# Patient Record
Sex: FEMALE | Race: WHITE | Employment: OTHER | URBAN - METROPOLITAN AREA
[De-identification: names, ages, dates, MRNs, and addresses within clinical notes are randomized per-mention and may not be internally consistent; named-entity substitution may affect disease eponyms.]

---

## 2021-02-01 ENCOUNTER — OFFICE VISIT (OUTPATIENT)
Dept: OBGYN CLINIC | Age: 66
End: 2021-02-01
Payer: COMMERCIAL

## 2021-02-01 VITALS
HEIGHT: 63 IN | SYSTOLIC BLOOD PRESSURE: 132 MMHG | BODY MASS INDEX: 37.21 KG/M2 | WEIGHT: 210 LBS | DIASTOLIC BLOOD PRESSURE: 78 MMHG

## 2021-02-01 DIAGNOSIS — N89.8 VAGINAL ODOR: ICD-10-CM

## 2021-02-01 DIAGNOSIS — N89.8 VAGINAL ITCHING: Primary | ICD-10-CM

## 2021-02-01 DIAGNOSIS — L29.2 VULVAR ITCHING: ICD-10-CM

## 2021-02-01 PROCEDURE — 99203 OFFICE O/P NEW LOW 30 MIN: CPT | Performed by: OBSTETRICS & GYNECOLOGY

## 2021-02-01 RX ORDER — LANOLIN ALCOHOL/MO/W.PET/CERES
1000 CREAM (GRAM) TOPICAL DAILY
COMMUNITY

## 2021-02-01 RX ORDER — ISOSORBIDE MONONITRATE 30 MG/1
TABLET, EXTENDED RELEASE ORAL
COMMUNITY
Start: 2020-12-20 | End: 2021-08-24 | Stop reason: SDUPTHER

## 2021-02-01 RX ORDER — NITROGLYCERIN 0.4 MG/1
0.4 TABLET SUBLINGUAL EVERY 5 MIN PRN
COMMUNITY

## 2021-02-01 RX ORDER — CLOBETASOL PROPIONATE 0.5 MG/G
OINTMENT TOPICAL
Qty: 1 TUBE | Refills: 2 | Status: SHIPPED | OUTPATIENT
Start: 2021-02-01

## 2021-02-01 RX ORDER — ASPIRIN 81 MG/1
81 TABLET ORAL DAILY
COMMUNITY

## 2021-02-01 RX ORDER — ACETAMINOPHEN 160 MG
TABLET,DISINTEGRATING ORAL
COMMUNITY

## 2021-02-01 RX ORDER — PHENOL 1.4 %
1 AEROSOL, SPRAY (ML) MUCOUS MEMBRANE DAILY
COMMUNITY

## 2021-02-01 RX ORDER — ATORVASTATIN CALCIUM 20 MG/1
TABLET, FILM COATED ORAL
COMMUNITY
Start: 2020-12-17 | End: 2021-08-24 | Stop reason: SDUPTHER

## 2021-02-01 RX ORDER — PANTOPRAZOLE SODIUM 40 MG/1
40 GRANULE, DELAYED RELEASE ORAL
COMMUNITY
End: 2021-02-02

## 2021-02-01 RX ORDER — ANTIARTHRITIC COMBINATION NO.2 900 MG
TABLET ORAL
COMMUNITY

## 2021-02-01 RX ORDER — ALBUTEROL SULFATE 90 UG/1
AEROSOL, METERED RESPIRATORY (INHALATION)
COMMUNITY
Start: 2021-01-18

## 2021-02-01 RX ORDER — MULTIVIT-MIN/IRON/FOLIC ACID/K 18-600-40
CAPSULE ORAL
COMMUNITY

## 2021-02-01 RX ORDER — LOSARTAN POTASSIUM 50 MG/1
TABLET ORAL
COMMUNITY
Start: 2020-12-17

## 2021-02-01 RX ORDER — SPIRONOLACTONE 25 MG/1
TABLET ORAL
COMMUNITY
Start: 2020-12-17 | End: 2021-08-24 | Stop reason: SDUPTHER

## 2021-02-01 RX ORDER — METOPROLOL SUCCINATE 25 MG/1
TABLET, EXTENDED RELEASE ORAL
COMMUNITY
Start: 2020-12-17 | End: 2021-08-24 | Stop reason: SDUPTHER

## 2021-02-01 SDOH — HEALTH STABILITY: MENTAL HEALTH: HOW MANY STANDARD DRINKS CONTAINING ALCOHOL DO YOU HAVE ON A TYPICAL DAY?: NOT ASKED

## 2021-02-01 ASSESSMENT — PATIENT HEALTH QUESTIONNAIRE - PHQ9
SUM OF ALL RESPONSES TO PHQ9 QUESTIONS 1 & 2: 0
2. FEELING DOWN, DEPRESSED OR HOPELESS: 0
SUM OF ALL RESPONSES TO PHQ QUESTIONS 1-9: 0
SUM OF ALL RESPONSES TO PHQ QUESTIONS 1-9: 0
1. LITTLE INTEREST OR PLEASURE IN DOING THINGS: 0

## 2021-02-01 NOTE — PROGRESS NOTES
SUBJECTIVE:   63 y.o. [id number] female complains of significant vulvar itching for several years. Pt has not been seen or treated for this issue. PT was previously advised her vaginal skin was 'very thin'. Pt denies any discharge or odor. Pt has tried anti-itch baths and pt has applied vinegar to the area without relief. Review of Systems:  General ROS: negative  Psychological ROS: negative  ENT ROS: negative  Endocrine ROS: negative  Respiratory ROS: no cough, shortness of breath, or wheezing  Cardiovascular ROS: no chest pain or dyspnea on exertion  Gastrointestinal ROS: no abdominal pain, change in bowel habits, or black or bloody stools  Genito-Urinary ROS: no dysuria, trouble voiding, or hematuria  Musculoskeletal ROS: negative  Neurological ROS: no TIA or stroke symptoms  Dermatological ROS: negative    OBJECTIVE:   /78   Ht 5' 3\" (1.6 m)   Wt 210 lb (95.3 kg)   BMI 37.20 kg/m²     Physical Exam:  CONSTITUTIONAL: She appears well nourished and developed   NEUROLOGIC: Alert and oriented to time, place and person  NECK: no thyroidmegaly  LUNGS: Clear to ascultation bilaterally  CVS: regular rate and rhythm  LYMPHATIC: No palpable lymph nodes  ABDOMEN: benign, soft, nontender, no masses. No liver or splenic organomegaly. No evidence of abdominal or inguinal hernia. No indication for occult blood testing  SKIN: normal texture and tone, no lesions  NEURO: normal tone, no hyperreflexia, 1+DTRs throughout    Pelvic Exam:   EFG: lichen sclerosis to anus  URETHRAL MEATUS: normal size, no diverticula   URETHRA: normal appearing without diverticula or lesions  BLADDER:  No masses or tenderness  VAGINA: normal rugae, no discharge, atrophic changes  PERINEUM: normal appearing without lesions or masses    ASSESSMENT:   Vaginitis  Lichen sclerosis    PLAN:   Past medical, social and family history reviewed and updated in pt's chart. STI panel sent to lab.   Rx clobetasol to pharmacy   Pt to use medication BID x 2wks and if sx not resolved plan for biopsy

## 2021-02-02 ENCOUNTER — OFFICE VISIT (OUTPATIENT)
Dept: CARDIOLOGY CLINIC | Age: 66
End: 2021-02-02
Payer: COMMERCIAL

## 2021-02-02 VITALS
HEART RATE: 68 BPM | DIASTOLIC BLOOD PRESSURE: 80 MMHG | WEIGHT: 212 LBS | BODY MASS INDEX: 37.55 KG/M2 | SYSTOLIC BLOOD PRESSURE: 130 MMHG

## 2021-02-02 DIAGNOSIS — I25.10 CORONARY ARTERY DISEASE INVOLVING NATIVE CORONARY ARTERY OF NATIVE HEART WITHOUT ANGINA PECTORIS: ICD-10-CM

## 2021-02-02 DIAGNOSIS — R73.03 BORDERLINE DIABETES: ICD-10-CM

## 2021-02-02 DIAGNOSIS — I10 ESSENTIAL HYPERTENSION: ICD-10-CM

## 2021-02-02 DIAGNOSIS — E66.9 CLASS II OBESITY: ICD-10-CM

## 2021-02-02 DIAGNOSIS — Z87.891 HISTORY OF TOBACCO ABUSE: ICD-10-CM

## 2021-02-02 DIAGNOSIS — E78.2 MIXED HYPERLIPIDEMIA: ICD-10-CM

## 2021-02-02 DIAGNOSIS — R07.2 PRECORDIAL PAIN: Primary | ICD-10-CM

## 2021-02-02 PROBLEM — R07.9 CHEST PAIN: Status: ACTIVE | Noted: 2021-02-02

## 2021-02-02 PROBLEM — K92.2 GASTROINTESTINAL HEMORRHAGE: Status: ACTIVE | Noted: 2021-02-02

## 2021-02-02 PROBLEM — R94.39 CARDIOVASCULAR STRESS TEST ABNORMAL: Status: ACTIVE | Noted: 2021-02-02

## 2021-02-02 PROBLEM — T50.905A ADVERSE REACTION TO DRUG: Status: ACTIVE | Noted: 2021-02-02

## 2021-02-02 PROBLEM — J44.9 CHRONIC OBSTRUCTIVE LUNG DISEASE (HCC): Status: ACTIVE | Noted: 2021-02-02

## 2021-02-02 PROBLEM — E78.5 HYPERLIPIDEMIA: Status: ACTIVE | Noted: 2021-02-02

## 2021-02-02 PROCEDURE — 99204 OFFICE O/P NEW MOD 45 MIN: CPT | Performed by: INTERNAL MEDICINE

## 2021-02-02 PROCEDURE — 93000 ELECTROCARDIOGRAM COMPLETE: CPT | Performed by: INTERNAL MEDICINE

## 2021-02-02 RX ORDER — PANTOPRAZOLE SODIUM 40 MG/1
40 TABLET, DELAYED RELEASE ORAL DAILY
COMMUNITY
End: 2021-08-24

## 2021-02-02 NOTE — PROGRESS NOTES
35 weeks pregnant due date is  on 2020.  Second pregnancy and on first pregnancy developed fluid around heart and was having shortness of breath and heart palpitations.  Today Rocío is really out of breath and more dizzy and upper back pain.  Heart rate is currently 130 on its lowest.  Rocío is having shortness of breath most of the time.  Shortness of breath started on Thursday and not seen for shortness of breath.  Rocío had low iron and was given iron after  and transfused after delivery.   FNA paged on call MD Anushka Berumen to phone FNA back at 10:37 am.  MD advised to have patient Rocío go to ED and FNA relayed message to Rocío.  Rocío agreed and will go to ED.      Reason for Disposition    [1] MILD difficulty breathing (e.g., minimal/no SOB at rest, SOB with walking, pulse <100) AND [2] NEW-onset or WORSE than normal    Additional Information    Negative: [1] Breathing stopped AND [2] hasn't returned    Negative: Choking on something    Negative: Severe difficulty breathing (e.g., struggling for each breath, speaks in single words)    Negative: Bluish (or gray) lips or face now    Negative: Difficult to awaken or acting confused (e.g., disoriented, slurred speech)    Negative: Passed out (i.e., lost consciousness, collapsed and was not responding)    Negative: Wheezing started suddenly after medicine, an allergic food or bee sting    Negative: Stridor    Negative: Slow, shallow and weak breathing    Negative: Sounds like a life-threatening emergency to the triager    Negative: Chest pain    Negative: [1] Wheezing (high pitched whistling sound) AND [2] previous asthma attacks or use of asthma medicines    Negative: [1] Difficulty breathing AND [2] only present when coughing    Negative: [1] Difficulty breathing AND [2] only from stuffy or runny nose    Negative: [1] MODERATE difficulty breathing (e.g., speaks in phrases, SOB even at rest, pulse 100-120) AND [2] NEW-onset or WORSE than  Chief Complaint   Patient presents with   Kalpana Woodard Cardiologist     [facility]    Coronary Artery Disease    Chest Pain       2-2-21: Patient presents for initial medical evaluation. Patient is followed on a regular basis by Dr. Isha Garcia DO. Patient recently moved from [place] [facility]. She was follow-up with cardiology and noted to have angina, status post abnormal stress test a couple years ago and underwent cardiac catheterization which showed 40% coronary artery disease stenosis. No PCI was performed. Patient also noted to have hypertension and placed on blood pressure medications. Pt denies chest pain, dyspnea, dyspnea on exertion, change in exercise capacity, fatigue,  nausea, vomiting, diarrhea, constipation, motor weakness, insomnia, weight loss, syncope, dizziness, lightheadedness, palpitations, PND, orthopnea, or claudication. EKG today with normal sinus rhythm, right bundle branch block, no evidence of any acute ischemia. She denies tobacco abuse. She states she has been diagnosed with borderline diabetes. Positive history of COPD, quit smoking a few years ago.           Patient Active Problem List   Diagnosis    Hyperlipidemia    Gastrointestinal hemorrhage    Chronic obstructive lung disease ([address]    Chest pain    Cardiovascular stress test abnormal    Adverse reaction to drug    Hx of colonic polyps    Arthritis    Coronary artery disease involving native coronary artery of native heart without angina pectoris    Essential hypertension    Borderline diabetes    Class II obesity    History of tobacco abuse       Past Surgical History:   Procedure Laterality Date    ABDOMEN SURGERY      rupture spleen    APPENDECTOMY      ENDOMETRIAL ABLATION      TUBAL LIGATION         Social History     Socioeconomic History    Marital status: Single     Spouse name: Not on file    Number of children: Not on file    Years of education: Not on file    "normal    Negative: Wheezing can be heard across the room    Negative: Drooling or spitting out saliva (because can't swallow)    Negative: History of prior \"blood clot\" in leg or lungs (i.e., deep vein thrombosis, pulmonary embolism)    Negative: History of inherited increased risk of blood clots (e.g., Factor 5 Leiden, Anti-thrombin 3, Protein C or Protein S deficiency, Prothrombin mutation)    Negative: Recent illness requiring prolonged bedrest (i.e., immobilization)    Negative: Hip or leg fracture in past 2 months (e.g., had cast on leg or ankle)    Negative: Major surgery in the past month    Negative: Recent long-distance travel with prolonged time in car, bus, plane, or train (i.e., within past 2 weeks; 6 or  more hours duration)    Negative: Extra heart beats OR irregular heart beating   (i.e., \"palpitations\")    Negative: Fever > 103 F (39.4 C)    Negative: [1] Fever > 101 F (38.3 C) AND [2] age > 60    Negative: [1] Fever > 100.0 F (37.8 C) AND [2] bedridden (e.g., nursing home patient, CVA, chronic illness, recovering from surgery)    Negative: [1] Fever > 100.0 F (37.8 C) AND [2] diabetes mellitus or weak immune system (e.g., HIV positive, cancer chemo, splenectomy, chronic steroids)    Negative: [1] Periods where breathing stops and then resumes normally AND [2] bedridden (e.g., nursing home patient, CVA)    Negative: Pregnant or postpartum (< 1 month since delivery)    Negative: Patient sounds very sick or weak to the triager    Protocols used: BREATHING DIFFICULTY-A-AH      " Highest education level: Not on file   Occupational History    Not on file   Social Needs    Financial resource strain: Not on file    Food insecurity     Worry: Not on file     Inability: Not on file    Transportation needs     Medical: Not on file     Non-medical: Not on file   Tobacco Use    Smoking status: Never Smoker    Smokeless tobacco: Never Used   Substance and Sexual Activity    Alcohol use: Yes     Comment: rarely    Drug use: Never    Sexual activity: Not on file   Lifestyle    Physical activity     Days per week: Not on file     Minutes per session: Not on file    Stress: Not on file   Relationships    Social connections     Talks on phone: Not on file     Gets together: Not on file     Attends [employer] service: Not on file     Active member of club or organization: Not on file     Attends meetings of clubs or organizations: Not on file     Relationship status: Not on file    Intimate partner violence     Fear of current or ex partner: Not on file     Emotionally abused: Not on file     Physically abused: Not on file     Forced sexual activity: Not on file   Other Topics Concern    Not on file   Social History Narrative    Not on file       Family History   Problem Relation Age of Onset    Cancer Maternal Grandmother     Breast Cancer Neg Hx     Colon Cancer Neg Hx     Eclampsia Neg Hx     Diabetes Neg Hx     Hypertension Neg Hx     Ovarian Cancer Neg Hx      Labor Neg Hx     Spont Abortions Neg Hx     Stroke Neg Hx        Current Outpatient Medications   Medication Sig Dispense Refill    pantoprazole (PROTONIX) 40 MG tablet Take 40 mg by mouth daily      albuterol sulfate  (90 Base) MCG/ACT inhaler       atorvastatin (LIPITOR) 20 MG tablet TAKE 1 TABLET BY MOUTH ONCE DAILY FOR 30 DAYS      isosorbide mononitrate (IMDUR) 30 MG extended release tablet TAKE 1 2 (ONE HALF) TABLET BY MOUTH ONCE DAILY FOR 30 DAYS      losartan (COZAAR) 50 MG tablet TAKE 1 TABLET BY MOUTH ONCE DAILY      metoprolol succinate (TOPROL XL) 25 MG extended release tablet TAKE 1 TABLET BY MOUTH ONCE DAILY FOR 30 DAYS      aspirin 81 MG EC tablet Take 81 mg by mouth daily      nitroGLYCERIN (NITROSTAT) 0.4 MG SL tablet Place 0.4 mg under the tongue every 5 minutes as needed for Chest pain up to max of 3 total doses. If no relief after 1 dose, call 911.  Biotin 5000 MCG TABS Take by mouth      calcium carbonate 600 MG TABS tablet Take 1 tablet by mouth daily      spironolactone (ALDACTONE) 25 MG tablet TAKE 1 TABLET BY MOUTH ONCE DAILY WITH FOOD FOR 30 DAYS      Chromium-Cinnamon (CINNAMON PLUS CHROMIUM)  MCG-MG CAPS Take by mouth      vitamin B-12 (CYANOCOBALAMIN) 1000 MCG tablet Take 1,000 mcg by mouth daily      Ascorbic Acid (VITAMIN C) 500 MG CAPS Take by mouth      Cholecalciferol (VITAMIN D3) 50 MCG (2000 UT) CAPS Take by mouth      clobetasol (TEMOVATE) 0.05 % ointment Apply to affected area BID x 2 wks 1 Tube 2     No current facility-administered medications for this visit. Novocain [procaine]    Review of Systems:  General ROS: negative  Psychological ROS: negative  Hematological and Lymphatic ROS: No history of blood clots or bleeding disorder. Respiratory ROS: no cough, shortness of breath, or wheezing  Cardiovascular ROS: no chest pain or dyspnea on exertion  Gastrointestinal ROS: no abdominal pain, change in bowel habits, or black or bloody stools  Genito-Urinary ROS: no dysuria, trouble voiding, or hematuria  Musculoskeletal ROS: negative  Neurological ROS: no TIA or stroke symptoms  Dermatological ROS: negative    VITALS:  Blood pressure 130/80, pulse 68, weight 212 lb (96.2 kg). Body mass index is 37.55 kg/m². Physical Examination:  General appearance - alert, well appearing, and in no distress  Mental status - alert, oriented to person, place, and time  Neck - Neck is supple, no JVD or carotid bruits. No thyromegaly or adenopathy.    Chest - clear to auscultation, no wheezes, rales or rhonchi, symmetric air entry  Heart - normal rate, regular rhythm, normal S1, S2, no murmurs, rubs, clicks or gallops  Abdomen - soft, nontender, nondistended, no masses or organomegaly  Neurological - alert, oriented, normal speech, no focal findings or movement disorder noted  Extremities - peripheral pulses normal, no pedal edema, no clubbing or cyanosis  Skin - normal coloration and turgor, no rashes, no suspicious skin lesions noted      EKG: normal sinus rhythm, nonspecific ST and T waves changes, RBBB    Orders Placed This Encounter   Procedures    EKG 12 Lead       ASSESSMENT:     Diagnosis Orders   1. Precordial pain  EKG 12 Lead   2. Coronary artery disease involving native coronary artery of native heart without angina pectoris     3. Mixed hyperlipidemia     4. Essential hypertension     5. Borderline diabetes     6. Class II obesity     7. History of tobacco abuse           PLAN:     Patient will need to continue to follow up with you for their general medical care    As always, aggressive risk factor modification is strongly recommended. We should adhere to the JNC VIII guidelines for HTN management and the NCEP ATP III guidelines for LDL-C management. Cardiac diet is always recommended with low fat, cholesterol, calories and sodium. Continue medications at current doses. Will continue to monitor patient clinically, if symptoms develop or worsen, they are to let me know ASAP or head to the nearest emergeny room. Patient was advised and encouraged to check blood pressure at home or at a pharmacy, maintain a logbook, and also call us back if blood pressure are above the target ranges or if it is low. Patient clearly understands and agrees to the instructions. We will need to continue to monitor muscle and liver enzymes, BUN, CR, and electrolytes.     Details of medical condition explained and patient was warned about adverse consequences of uncontrolled medical conditions and possible side effects of prescribed medications.

## 2021-02-08 DIAGNOSIS — N89.8 VAGINAL ITCHING: ICD-10-CM

## 2021-02-08 DIAGNOSIS — N89.8 VAGINAL ODOR: ICD-10-CM

## 2021-02-09 ENCOUNTER — HOSPITAL ENCOUNTER (OUTPATIENT)
Dept: WOMENS IMAGING | Age: 66
Discharge: HOME | End: 2021-02-11
Payer: COMMERCIAL

## 2021-02-09 DIAGNOSIS — Z12.31 BREAST CANCER SCREENING BY MAMMOGRAM: ICD-10-CM

## 2021-02-09 PROCEDURE — 77067 SCR MAMMO BI INCL CAD: CPT

## 2021-02-17 ENCOUNTER — OFFICE VISIT (OUTPATIENT)
Dept: PULMONOLOGY | Age: 66
End: 2021-02-17
Payer: COMMERCIAL

## 2021-02-17 VITALS
TEMPERATURE: 97.3 F | BODY MASS INDEX: 37.39 KG/M2 | HEIGHT: 63 IN | OXYGEN SATURATION: 98 % | WEIGHT: 211 LBS | DIASTOLIC BLOOD PRESSURE: 72 MMHG | HEART RATE: 74 BPM | SYSTOLIC BLOOD PRESSURE: 138 MMHG

## 2021-02-17 DIAGNOSIS — J44.9 CHRONIC OBSTRUCTIVE PULMONARY DISEASE, UNSPECIFIED COPD TYPE (HCC): Primary | ICD-10-CM

## 2021-02-17 DIAGNOSIS — E66.9 OBESITY (BMI 30-39.9): ICD-10-CM

## 2021-02-17 PROCEDURE — 99204 OFFICE O/P NEW MOD 45 MIN: CPT | Performed by: INTERNAL MEDICINE

## 2021-02-17 NOTE — PROGRESS NOTES
covid  Subjective:     Jolie Lovett is a 63 y.o. female who complains today of:     Chief Complaint   Patient presents with    New Patient     here for new patient appointment    COPD       HPI  She is diagnose with COPD, emphysema 14 yrs ago . She was placed nebulizer for breathing . She said she quit smoking at that time . She said she smoke for 35 yrs. She is on albuterol HFA  Prn . She has Symbicort but not using it. C/o shortness of breath with exertion not all the time . No Wheezing   Occasional Cough with  Clear Sputum  No Hemoptysis  No Chest tightness   No Chest pain with radiation  or pleuritic pain  No Fever or chills. No Rhinorrhea and postnasal drip.       Allergies:  Novocain [procaine]  Past Medical History:   Diagnosis Date    Borderline diabetes 2021    Class II obesity 2021    Coronary artery disease involving native coronary artery of native heart without angina pectoris 2021    Coronary artery disease involving native coronary artery of native heart without angina pectoris 2021    Essential hypertension 2021    Heart disease     History of tobacco abuse 2021    Hypertension      Past Surgical History:   Procedure Laterality Date    ABDOMEN SURGERY      rupture spleen    APPENDECTOMY      ENDOMETRIAL ABLATION      TUBAL LIGATION       Family History   Problem Relation Age of Onset    Cancer Maternal Grandmother     Breast Cancer Neg Hx     Colon Cancer Neg Hx     Eclampsia Neg Hx     Diabetes Neg Hx     Hypertension Neg Hx     Ovarian Cancer Neg Hx      Labor Neg Hx     Spont Abortions Neg Hx     Stroke Neg Hx      Social History     Socioeconomic History    Marital status: Single     Spouse name: Not on file    Number of children: Not on file    Years of education: Not on file    Highest education level: Not on file   Occupational History    Not on file   Social Needs    Financial resource strain: Not on file   Franco-Dali insecurity     Worry: Not on file     Inability: Not on file    Transportation needs     Medical: Not on file     Non-medical: Not on file   Tobacco Use    Smoking status: Never Smoker    Smokeless tobacco: Never Used   Substance and Sexual Activity    Alcohol use: Yes     Comment: rarely    Drug use: Never    Sexual activity: Not on file   Lifestyle    Physical activity     Days per week: Not on file     Minutes per session: Not on file    Stress: Not on file   Relationships    Social connections     Talks on phone: Not on file     Gets together: Not on file     Attends R[employer] service: Not on file     Active member of club or organization: Not on file     Attends meetings of clubs or organizations: Not on file     Relationship status: Not on file    Intimate partner violence     Fear of current or ex partner: Not on file     Emotionally abused: Not on file     Physically abused: Not on file     Forced sexual activity: Not on file   Other Topics Concern    Not on file   Social History Narrative    Not on file         Review of Systems   Constitutional: Negative for chills, diaphoresis, fatigue and fever. HENT: Negative for congestion, mouth sores, nosebleeds, postnasal drip, rhinorrhea, sinus pressure, sneezing, sore throat, trouble swallowing and voice change. Eyes: Negative for discharge, itching and visual disturbance. Respiratory: Positive for cough and shortness of breath. Negative for chest tightness and wheezing. Cardiovascular: Negative for chest pain, palpitations and leg swelling. Gastrointestinal: Negative for abdominal pain, diarrhea, nausea and vomiting. Genitourinary: Negative for difficulty urinating and hematuria. Musculoskeletal: Negative for arthralgias, joint swelling and myalgias. Skin: Negative for rash. Allergic/Immunologic: Negative for environmental allergies and food allergies. Neurological: Negative for dizziness, tremors, weakness and headaches. Psychiatric/Behavioral: Negative for behavioral problems and sleep disturbance.         :     Vitals:    02/17/21 1109   BP: 138/72   Pulse: 74   Temp: 97.3 °F (36.3 °C)   SpO2: 98%   Weight: 211 lb (95.7 kg)   Height: 5' 3\" (1.6 m)     Wt Readings from Last 3 Encounters:   02/17/21 211 lb (95.7 kg)   02/02/21 212 lb (96.2 kg)   02/01/21 210 lb (95.3 kg)         Physical Exam  Constitutional:       General: She is not in acute distress. Appearance: She is well-developed. She is obese. She is not diaphoretic. HENT:      Head: Normocephalic and atraumatic. Nose: Nose normal.   Eyes:      Pupils: Pupils are equal, round, and reactive to light. Neck:      Thyroid: No thyromegaly. Vascular: No JVD. Trachea: No tracheal deviation. Cardiovascular:      Rate and Rhythm: Normal rate and regular rhythm. Heart sounds: No murmur. No friction rub. No gallop. Pulmonary:      Effort: No respiratory distress. Breath sounds: No wheezing or rales. Comments: diminished Breath sound bilaterally. Chest:      Chest wall: No tenderness. Abdominal:      General: There is no distension. Tenderness: There is no abdominal tenderness. There is no rebound. Musculoskeletal: Normal range of motion. Lymphadenopathy:      Cervical: No cervical adenopathy. Skin:     General: Skin is warm and dry. Neurological:      Mental Status: She is alert and oriented to person, place, and time.       Coordination: Coordination normal.         Current Outpatient Medications   Medication Sig Dispense Refill    pantoprazole (PROTONIX) 40 MG tablet Take 40 mg by mouth daily      albuterol sulfate  (90 Base) MCG/ACT inhaler       atorvastatin (LIPITOR) 20 MG tablet TAKE 1 TABLET BY MOUTH ONCE DAILY FOR 30 DAYS      isosorbide mononitrate (IMDUR) 30 MG extended release tablet TAKE 1 2 (ONE HALF) TABLET BY MOUTH ONCE DAILY FOR 30 DAYS      losartan (COZAAR) 50 MG tablet TAKE 1 TABLET BY MOUTH ONCE DAILY      metoprolol succinate (TOPROL XL) 25 MG extended release tablet TAKE 1 TABLET BY MOUTH ONCE DAILY FOR 30 DAYS      aspirin 81 MG EC tablet Take 81 mg by mouth daily      nitroGLYCERIN (NITROSTAT) 0.4 MG SL tablet Place 0.4 mg under the tongue every 5 minutes as needed for Chest pain up to max of 3 total doses. If no relief after 1 dose, call 911.  Biotin 5000 MCG TABS Take by mouth      calcium carbonate 600 MG TABS tablet Take 1 tablet by mouth daily      spironolactone (ALDACTONE) 25 MG tablet TAKE 1 TABLET BY MOUTH ONCE DAILY WITH FOOD FOR 30 DAYS      Chromium-Cinnamon (CINNAMON PLUS CHROMIUM)  MCG-MG CAPS Take by mouth      vitamin B-12 (CYANOCOBALAMIN) 1000 MCG tablet Take 1,000 mcg by mouth daily      Ascorbic Acid (VITAMIN C) 500 MG CAPS Take by mouth      Cholecalciferol (VITAMIN D3) 50 MCG (2000 UT) CAPS Take by mouth      clobetasol (TEMOVATE) 0.05 % ointment Apply to affected area BID x 2 wks 1 Tube 2     No current facility-administered medications for this visit. Assessment/Plan:     1. Chronic obstructive pulmonary disease, unspecified COPD type ([facility] [address]  She says she has been diagnosed with COPD and emphysema about 14 years ago at that time she quit smoking but she is smoked for 35 years before that. She was placed on nebulizer treatments. Currently she is using albuterol HFA as needed basis she also has a Symbicort at home but she is not using it. She says she is having short of breath with exertion but not all the time she has occasional cough with clear mucus no wheezing. She has no chest x-ray or PFT done in the past so I will request both for further evaluation and treatment plan regarding COPD. - XR CHEST STANDARD (2 VW); Future  - Full PFT Study With Bronchodilator; Future    2. Obesity (BMI 30-39. 9)  She is advised try to lose weight. obesity related risk explained to the patient ,  Current weight:  211 lb (95.7 kg) Lbs.  BMI:  Body mass index is 37.38 kg/m². Suggested weight control approaches, including dietary changes , exercise, behavioral modification. Return in about 6 weeks (around 3/31/2021) for COPD.       Casa Gonzalez MD

## 2021-02-20 ASSESSMENT — ENCOUNTER SYMPTOMS
EYE DISCHARGE: 0
VOMITING: 0
TROUBLE SWALLOWING: 0
RHINORRHEA: 0
NAUSEA: 0
SINUS PRESSURE: 0
VOICE CHANGE: 0
DIARRHEA: 0
CHEST TIGHTNESS: 0
WHEEZING: 0
ABDOMINAL PAIN: 0
COUGH: 1
SHORTNESS OF BREATH: 1
SORE THROAT: 0
EYE ITCHING: 0

## 2021-03-05 ENCOUNTER — HOSPITAL ENCOUNTER (OUTPATIENT)
Dept: PULMONOLOGY | Age: 66
Discharge: HOME | End: 2021-03-05
Payer: COMMERCIAL

## 2021-03-05 ENCOUNTER — HOSPITAL ENCOUNTER (OUTPATIENT)
Dept: GENERAL RADIOLOGY | Age: 66
Discharge: HOME | End: 2021-03-07
Payer: COMMERCIAL

## 2021-03-05 DIAGNOSIS — J44.9 CHRONIC OBSTRUCTIVE PULMONARY DISEASE, UNSPECIFIED COPD TYPE (HCC): ICD-10-CM

## 2021-03-05 PROCEDURE — 94729 DIFFUSING CAPACITY: CPT

## 2021-03-05 PROCEDURE — 71046 X-RAY EXAM CHEST 2 VIEWS: CPT

## 2021-03-05 PROCEDURE — 94060 EVALUATION OF WHEEZING: CPT | Performed by: INTERNAL MEDICINE

## 2021-03-05 PROCEDURE — 94726 PLETHYSMOGRAPHY LUNG VOLUMES: CPT | Performed by: INTERNAL MEDICINE

## 2021-03-05 PROCEDURE — 94060 EVALUATION OF WHEEZING: CPT

## 2021-03-05 PROCEDURE — 94726 PLETHYSMOGRAPHY LUNG VOLUMES: CPT

## 2021-03-05 PROCEDURE — 94729 DIFFUSING CAPACITY: CPT | Performed by: INTERNAL MEDICINE

## 2021-03-05 PROCEDURE — 6360000002 HC RX W HCPCS: Performed by: INTERNAL MEDICINE

## 2021-03-05 RX ORDER — ALBUTEROL SULFATE 2.5 MG/3ML
2.5 SOLUTION RESPIRATORY (INHALATION) ONCE
Status: COMPLETED | OUTPATIENT
Start: 2021-03-05 | End: 2021-03-05

## 2021-03-05 RX ADMIN — ALBUTEROL SULFATE 2.5 MG: 2.5 SOLUTION RESPIRATORY (INHALATION) at 11:18

## 2021-03-08 NOTE — PROCEDURES
[address]                      [address], [address] [facility]                               PULMONARY FUNCTION    PATIENT NAME: Jocelyn Berman             :        1955  MED REC NO:   [id number]                            ROOM:  ACCOUNT NO:   [de-identified]                           ADMIT DATE: 2021  PROVIDER:     Yarely Parra MD    DATE OF PROCEDURE:  2021    REASON FOR STUDY:  Shortness of breath, cough. INTERPRETATION:  FVC is 2.09, 61% of predicted. FEV1 is 1.46 61% of  predicted. FEV1/FVC is 69%. FEF 25-75% is 0.90, 41% of predicted. Postbronchodilator study shows FEV1 is 1.70, 16% improvement. FVC 2.17,  3% improvement. At midflow, FEF 25-75% is 1.64, 82% improvement. Lung  volume study shows residual volume is 2.57, 128% of predicted. TLC is  4.78, 97% of predicted. RV to TLC ratio 53.87, 132% of predicted. Diffusion capacity 20.90, 100% of predicted. Airway resistance 3.05,  163% of predicted. SUMMARY:  Moderate obstructive pulmonary disease. There is significant  response to bronchodilators. Static lung volume study suggests air  trapping and hyperinflation. Diffusion capacity within normal range. Airway resistance is increased. Flow volume loop suggests obstructive  pulmonary disease. Clinical correlation is requested.         Nir Coleman MD    D: 2021 20:31:51       T: 2021 0:46:14     [id number]  Job#: [id number]     Doc#: [id number]    CC:

## 2021-04-01 ENCOUNTER — VIRTUAL VISIT (OUTPATIENT)
Dept: PULMONOLOGY | Age: 66
End: 2021-04-01
Payer: COMMERCIAL

## 2021-04-01 DIAGNOSIS — J44.9 CHRONIC OBSTRUCTIVE PULMONARY DISEASE, UNSPECIFIED COPD TYPE (HCC): Primary | ICD-10-CM

## 2021-04-01 DIAGNOSIS — E66.9 OBESITY (BMI 30-39.9): ICD-10-CM

## 2021-04-01 PROCEDURE — 99443 PR PHYS/QHP TELEPHONE EVALUATION 21-30 MIN: CPT | Performed by: INTERNAL MEDICINE

## 2021-04-01 ASSESSMENT — ENCOUNTER SYMPTOMS
SINUS PRESSURE: 0
VOICE CHANGE: 0
VOMITING: 0
SHORTNESS OF BREATH: 1
WHEEZING: 0
DIARRHEA: 0
EYE ITCHING: 0
EYE DISCHARGE: 0
NAUSEA: 0
COUGH: 1
RHINORRHEA: 0
CHEST TIGHTNESS: 0
ABDOMINAL PAIN: 0
SORE THROAT: 0
TROUBLE SWALLOWING: 0

## 2021-04-01 NOTE — PROGRESS NOTES
Subjective:     Shantel Buenrostro is a 63 y.o. female who complains today of:     Chief Complaint   Patient presents with    Follow-up       HPI  Patient and/or health care decision maker is aware that that he/she may receive a bill for this telephone service, depending on his insurance coverage, and has provided verbal consent to proceed. She is using She is on albuterol HFA  Prn  C/o shortness of breath  with exertion. No  Wheezing   Cough with clear white  Sputum  No Hemoptysis  No Chest tightness   No Chest pain with radiation  or pleuritic pain  No Fever or chills. No Rhinorrhea and postnasal drip. She had PFT done moderate  Obstructive disease  and significant response to bronchodilator, CXR no active disease.       Allergies:  Novocain [procaine]  Past Medical History:   Diagnosis Date    Borderline diabetes 2021    Class II obesity 2021    Coronary artery disease involving native coronary artery of native heart without angina pectoris 2021    Coronary artery disease involving native coronary artery of native heart without angina pectoris 2021    Essential hypertension 2021    Heart disease     History of tobacco abuse 2021    Hypertension      Past Surgical History:   Procedure Laterality Date    ABDOMEN SURGERY      rupture spleen    APPENDECTOMY      ENDOMETRIAL ABLATION      TUBAL LIGATION       Family History   Problem Relation Age of Onset    Cancer Maternal Grandmother     Breast Cancer Neg Hx     Colon Cancer Neg Hx     Eclampsia Neg Hx     Diabetes Neg Hx     Hypertension Neg Hx     Ovarian Cancer Neg Hx      Labor Neg Hx     Spont Abortions Neg Hx     Stroke Neg Hx      Social History     Socioeconomic History    Marital status: Single     Spouse name: Not on file    Number of children: Not on file    Years of education: Not on file    Highest education level: Not on file   Occupational History    Not on file   Social Needs    Financial resource strain: Not on file    Food insecurity     Worry: Not on file     Inability: Not on file    Transportation needs     Medical: Not on file     Non-medical: Not on file   Tobacco Use    Smoking status: Never Smoker    Smokeless tobacco: Never Used   Substance and Sexual Activity    Alcohol use: Yes     Comment: rarely    Drug use: Never    Sexual activity: Not on file   Lifestyle    Physical activity     Days per week: Not on file     Minutes per session: Not on file    Stress: Not on file   Relationships    Social connections     Talks on phone: Not on file     Gets together: Not on file     Attends Jew service: Not on file     Active member of club or organization: Not on file     Attends meetings of clubs or organizations: Not on file     Relationship status: Not on file    Intimate partner violence     Fear of current or ex partner: Not on file     Emotionally abused: Not on file     Physically abused: Not on file     Forced sexual activity: Not on file   Other Topics Concern    Not on file   Social History Narrative    Not on file         Review of Systems   Constitutional: Negative for chills, diaphoresis, fatigue and fever. HENT: Negative for congestion, mouth sores, nosebleeds, postnasal drip, rhinorrhea, sinus pressure, sneezing, sore throat, trouble swallowing and voice change. Eyes: Negative for discharge, itching and visual disturbance. Respiratory: Positive for cough and shortness of breath. Negative for chest tightness and wheezing. Cardiovascular: Negative for chest pain, palpitations and leg swelling. Gastrointestinal: Negative for abdominal pain, diarrhea, nausea and vomiting. Genitourinary: Negative for difficulty urinating and hematuria. Musculoskeletal: Negative for arthralgias, joint swelling and myalgias. Skin: Negative for rash. Allergic/Immunologic: Negative for environmental allergies and food allergies.    Neurological: Negative for dizziness, tremors, weakness and headaches. Psychiatric/Behavioral: Negative for behavioral problems and sleep disturbance.         :   There were no vitals filed for this visit. Wt Readings from Last 3 Encounters:   02/17/21 211 lb (95.7 kg)   02/02/21 212 lb (96.2 kg)   02/01/21 210 lb (95.3 kg)         Physical Exam phone visit    Current Outpatient Medications   Medication Sig Dispense Refill    umeclidinium-vilanterol (ANORO ELLIPTA) 62.5-25 MCG/INH AEPB inhaler Inhale 1 puff into the lungs daily 30 puff 3    pantoprazole (PROTONIX) 40 MG tablet Take 40 mg by mouth daily      albuterol sulfate  (90 Base) MCG/ACT inhaler       atorvastatin (LIPITOR) 20 MG tablet TAKE 1 TABLET BY MOUTH ONCE DAILY FOR 30 DAYS      isosorbide mononitrate (IMDUR) 30 MG extended release tablet TAKE 1 2 (ONE HALF) TABLET BY MOUTH ONCE DAILY FOR 30 DAYS      losartan (COZAAR) 50 MG tablet TAKE 1 TABLET BY MOUTH ONCE DAILY      metoprolol succinate (TOPROL XL) 25 MG extended release tablet TAKE 1 TABLET BY MOUTH ONCE DAILY FOR 30 DAYS      aspirin 81 MG EC tablet Take 81 mg by mouth daily      nitroGLYCERIN (NITROSTAT) 0.4 MG SL tablet Place 0.4 mg under the tongue every 5 minutes as needed for Chest pain up to max of 3 total doses. If no relief after 1 dose, call 911.  Biotin 5000 MCG TABS Take by mouth      calcium carbonate 600 MG TABS tablet Take 1 tablet by mouth daily      spironolactone (ALDACTONE) 25 MG tablet TAKE 1 TABLET BY MOUTH ONCE DAILY WITH FOOD FOR 30 DAYS      Chromium-Cinnamon (CINNAMON PLUS CHROMIUM)  MCG-MG CAPS Take by mouth      vitamin B-12 (CYANOCOBALAMIN) 1000 MCG tablet Take 1,000 mcg by mouth daily      Ascorbic Acid (VITAMIN C) 500 MG CAPS Take by mouth      Cholecalciferol (VITAMIN D3) 50 MCG (2000 UT) CAPS Take by mouth      clobetasol (TEMOVATE) 0.05 % ointment Apply to affected area BID x 2 wks 1 Tube 2     No current facility-administered medications for this visit. Results for orders placed during the hospital encounter of 21   XR CHEST (2 VW)    Narrative EXAMINATION: XR CHEST (2 VW)     CLINICAL HISTORY: J44.9 Chronic obstructive pulmonary disease, unspecified COPD type ([facility] [address] ICD10    COMPARISONS: None     FINDINGS:    Two views of the chest are submitted. The cardiac silhouette is of normal size configuration. Pulmonary vascular unremarkable. Right sided trachea. No focal infiltrates. No effusions. No Pneumothoraces. Impression NO ACUTE ACTIVE CARDIOPULMONARY PROCESS     Signed by Brandy Abrams MD on 21 at [phone] [id number]        Show:Clear all  [x]Manual[]Template[]Copied    Added by:  Helen Munoz MD    []Hover for details                       [facility], [address] [place] [address]                                PULMONARY FUNCTION     PATIENT NAME: Neri Jacobo             :        1955  MED REC NO:   [id number]                            ROOM:  ACCOUNT NO:   [de-identified]                           ADMIT DATE: 2021  PROVIDER:     Brandy Abrams MD     DATE OF PROCEDURE:  2021     REASON FOR STUDY:  Shortness of breath, cough.     INTERPRETATION:  FVC is 2.09, 61% of predicted. FEV1 is 1.46 61% of  predicted. FEV1/FVC is 69%. FEF 25-75% is 0.90, 41% of predicted. Postbronchodilator study shows FEV1 is 1.70, 16% improvement. FVC 2.17,  3% improvement. At midflow, FEF 25-75% is 1.64, 82% improvement. Lung  volume study shows residual volume is 2.57, 128% of predicted. TLC is  4.78, 97% of predicted. RV to TLC ratio 53.87, 132% of predicted. Diffusion capacity 20.90, 100% of predicted. Airway resistance 3.05,  163% of predicted.     SUMMARY:  Moderate obstructive pulmonary disease. There is significant  response to bronchodilators.   Static lung volume study suggests air  trapping and

## 2021-04-08 ENCOUNTER — HOSPITAL ENCOUNTER (OUTPATIENT)
Dept: ULTRASOUND IMAGING | Age: 66
Discharge: HOME | End: 2021-04-10
Payer: COMMERCIAL

## 2021-04-08 DIAGNOSIS — R59.1 LYMPHADENOPATHY: ICD-10-CM

## 2021-04-08 PROCEDURE — 76536 US EXAM OF HEAD AND NECK: CPT

## 2021-04-08 PROCEDURE — 76999 ECHO EXAMINATION PROCEDURE: CPT

## 2021-06-30 ENCOUNTER — HOSPITAL ENCOUNTER (OUTPATIENT)
Dept: CT IMAGING | Age: 66
Discharge: HOME | End: 2021-07-02
Payer: COMMERCIAL

## 2021-06-30 DIAGNOSIS — R59.0 ENLARGED LYMPH NODES IN ARMPIT: ICD-10-CM

## 2021-06-30 LAB
GFR AFRICAN AMERICAN: >60
GFR NON-AFRICAN AMERICAN: >60
PERFORMED ON: NORMAL
POC CREATININE: 0.7 MG/DL (ref 0.6–1.2)
POC SAMPLE TYPE: NORMAL

## 2021-06-30 PROCEDURE — 71260 CT THORAX DX C+: CPT

## 2021-06-30 PROCEDURE — 2580000003 HC RX 258: Performed by: INTERNAL MEDICINE

## 2021-06-30 PROCEDURE — 6360000004 HC RX CONTRAST MEDICATION: Performed by: INTERNAL MEDICINE

## 2021-06-30 RX ORDER — SODIUM CHLORIDE 0.9 % (FLUSH) 0.9 %
10 SYRINGE (ML) INJECTION ONCE
Status: COMPLETED | OUTPATIENT
Start: 2021-06-30 | End: 2021-06-30

## 2021-06-30 RX ADMIN — SODIUM CHLORIDE, PRESERVATIVE FREE 10 ML: 5 INJECTION INTRAVENOUS at 09:03

## 2021-06-30 RX ADMIN — IOPAMIDOL 100 ML: 755 INJECTION, SOLUTION INTRAVENOUS at 09:02

## 2021-08-24 ENCOUNTER — OFFICE VISIT (OUTPATIENT)
Dept: CARDIOLOGY CLINIC | Age: 66
End: 2021-08-24
Payer: COMMERCIAL

## 2021-08-24 VITALS
WEIGHT: 209 LBS | SYSTOLIC BLOOD PRESSURE: 110 MMHG | OXYGEN SATURATION: 97 % | DIASTOLIC BLOOD PRESSURE: 70 MMHG | TEMPERATURE: 97.2 F | BODY MASS INDEX: 37.02 KG/M2 | HEART RATE: 78 BPM

## 2021-08-24 DIAGNOSIS — Z87.891 HISTORY OF TOBACCO ABUSE: ICD-10-CM

## 2021-08-24 DIAGNOSIS — E66.9 OBESITY (BMI 30-39.9): ICD-10-CM

## 2021-08-24 DIAGNOSIS — I25.10 CORONARY ARTERY DISEASE INVOLVING NATIVE CORONARY ARTERY OF NATIVE HEART WITHOUT ANGINA PECTORIS: Primary | ICD-10-CM

## 2021-08-24 DIAGNOSIS — I10 ESSENTIAL HYPERTENSION: ICD-10-CM

## 2021-08-24 DIAGNOSIS — E78.2 MIXED HYPERLIPIDEMIA: ICD-10-CM

## 2021-08-24 PROBLEM — R07.9 CHEST PAIN: Status: RESOLVED | Noted: 2021-02-02 | Resolved: 2021-08-24

## 2021-08-24 PROCEDURE — 99214 OFFICE O/P EST MOD 30 MIN: CPT | Performed by: INTERNAL MEDICINE

## 2021-08-24 RX ORDER — ISOSORBIDE MONONITRATE 30 MG/1
TABLET, EXTENDED RELEASE ORAL
Qty: 30 TABLET | Refills: 5 | Status: SHIPPED | OUTPATIENT
Start: 2021-08-24

## 2021-08-24 RX ORDER — SPIRONOLACTONE 25 MG/1
TABLET ORAL
Qty: 30 TABLET | Refills: 5 | Status: SHIPPED | OUTPATIENT
Start: 2021-08-24

## 2021-08-24 RX ORDER — METOPROLOL SUCCINATE 25 MG/1
TABLET, EXTENDED RELEASE ORAL
Qty: 30 TABLET | Refills: 5 | Status: SHIPPED | OUTPATIENT
Start: 2021-08-24

## 2021-08-24 RX ORDER — FAMOTIDINE 40 MG/1
TABLET, FILM COATED ORAL
COMMUNITY
Start: 2021-06-18

## 2021-08-24 RX ORDER — ATORVASTATIN CALCIUM 20 MG/1
TABLET, FILM COATED ORAL
Qty: 30 TABLET | Refills: 5 | Status: SHIPPED | OUTPATIENT
Start: 2021-08-24

## 2021-08-24 NOTE — PROGRESS NOTES
Chief Complaint   Patient presents with    Coronary Artery Disease    6 Month Follow-Up       2-2-21: Patient presents for initial medical evaluation. Patient is followed on a regular basis by Dr. Gurpreet Cantrell DO. Patient recently moved from [place] [facility]. She was follow-up with cardiology and noted to have angina, status post abnormal stress test a couple years ago and underwent cardiac catheterization which showed 40% coronary artery disease stenosis. No PCI was performed. Patient also noted to have hypertension and placed on blood pressure medications. Pt denies chest pain, dyspnea, dyspnea on exertion, change in exercise capacity, fatigue,  nausea, vomiting, diarrhea, constipation, motor weakness, insomnia, weight loss, syncope, dizziness, lightheadedness, palpitations, PND, orthopnea, or claudication. EKG today with normal sinus rhythm, right bundle branch block, no evidence of any acute ischemia. She denies tobacco abuse. She states she has been diagnosed with borderline diabetes. Positive history of COPD, quit smoking a few years ago. 8-24-21: Pt denies chest pain,  fatigue,  nausea, vomiting, diarrhea, constipation, motor weakness, insomnia, weight loss, syncope, dizziness, lightheadedness, palpitations, PND, orthopnea, or claudication. No nitro use. BP and hr are good. CAD is stable. No LE discoloration or ulcers. No LE edema. No CHF type symptoms. Lipid profile is normal. No recent hospitalization. No change in meds. Does have baseline SOB. No smoking. Has lost a few pounds. No LE edema. She does have GERD and is on Pepcid.        Patient Active Problem List   Diagnosis    Hyperlipidemia    Gastrointestinal hemorrhage    Chronic obstructive lung disease ([facility] [place] [address]    Cardiovascular stress test abnormal    Adverse reaction to drug    Hx of colonic polyps    Arthritis    Coronary artery disease involving native coronary artery of native heart without angina pectoris    Essential hypertension  Borderline diabetes    Obesity (BMI 30-39. 9)    History of tobacco abuse       Past Surgical History:   Procedure Laterality Date    ABDOMEN SURGERY      rupture spleen    APPENDECTOMY      ENDOMETRIAL ABLATION      TUBAL LIGATION         Social History     Socioeconomic History    Marital status: Single     Spouse name: Not on file    Number of children: Not on file    Years of education: Not on file    Highest education level: Not on file   Occupational History    Not on file   Tobacco Use    Smoking status: Never Smoker    Smokeless tobacco: Never Used   Substance and Sexual Activity    Alcohol use: Yes     Comment: rarely    Drug use: Never    Sexual activity: Not on file   Other Topics Concern    Not on file   Social History Narrative    Not on file     Social Determinants of Health     Financial Resource Strain:     Difficulty of Paying Living Expenses:    Food Insecurity:     Worried About Running Out of Food in the Last Year:     Ran Out of Food in the Last Year:    Transportation Needs:     Lack of Transportation (Medical):      Lack of Transportation (Non-Medical):    Physical Activity:     Days of Exercise per Week:     Minutes of Exercise per Session:    Stress:     Feeling of Stress :    Social Connections:     Frequency of Communication with Friends and Family:     Frequency of Social Gatherings with Friends and Family:     Attends [employer] Services:     Active Member of Clubs or Organizations:     Attends Club or Organization Meetings:     Marital Status:    Intimate Partner Violence:     Fear of Current or Ex-Partner:     Emotionally Abused:     Physically Abused:     Sexually Abused:        Family History   Problem Relation Age of Onset    Cancer Maternal Grandmother     Breast Cancer Neg Hx     Colon Cancer Neg Hx     Eclampsia Neg Hx     Diabetes Neg Hx     Hypertension Neg Hx     Ovarian Cancer Neg Hx      Labor Neg Hx     Spont Abortions Neg Hx     Stroke Neg Hx        Current Outpatient Medications   Medication Sig Dispense Refill    famotidine (PEPCID) 40 MG tablet take 1 tablet by mouth once daily if needed for HEARTBURN      isosorbide mononitrate (IMDUR) 30 MG extended release tablet TAKE 1 2 (ONE HALF) TABLET BY MOUTH ONCE DAILY FOR 30 DAYS 30 tablet 5    atorvastatin (LIPITOR) 20 MG tablet TAKE 1 TABLET BY MOUTH ONCE DAILY FOR 30 DAYS 30 tablet 5    metoprolol succinate (TOPROL XL) 25 MG extended release tablet TAKE 1 TABLET BY MOUTH ONCE DAILY FOR 30 DAYS 30 tablet 5    spironolactone (ALDACTONE) 25 MG tablet TAKE 1 TABLET BY MOUTH ONCE DAILY WITH FOOD FOR 30 DAYS 30 tablet 5    umeclidinium-vilanterol (ANORO ELLIPTA) 62.5-25 MCG/INH AEPB inhaler Inhale 1 puff into the lungs daily 30 puff 3    albuterol sulfate  (90 Base) MCG/ACT inhaler       losartan (COZAAR) 50 MG tablet TAKE 1 TABLET BY MOUTH ONCE DAILY      aspirin 81 MG EC tablet Take 81 mg by mouth daily      nitroGLYCERIN (NITROSTAT) 0.4 MG SL tablet Place 0.4 mg under the tongue every 5 minutes as needed for Chest pain up to max of 3 total doses. If no relief after 1 dose, call 911.  Biotin 5000 MCG TABS Take by mouth      calcium carbonate 600 MG TABS tablet Take 1 tablet by mouth daily      Chromium-Cinnamon (CINNAMON PLUS CHROMIUM)  MCG-MG CAPS Take by mouth      vitamin B-12 (CYANOCOBALAMIN) 1000 MCG tablet Take 1,000 mcg by mouth daily      Ascorbic Acid (VITAMIN C) 500 MG CAPS Take by mouth      Cholecalciferol (VITAMIN D3) 50 MCG (2000 UT) CAPS Take by mouth      clobetasol (TEMOVATE) 0.05 % ointment Apply to affected area BID x 2 wks 1 Tube 2     No current facility-administered medications for this visit. Novocain [procaine]    Review of Systems:  General ROS: negative  Psychological ROS: negative  Hematological and Lymphatic ROS: No history of blood clots or bleeding disorder.    Respiratory ROS: no cough, shortness of breath, or wheezing  Cardiovascular ROS: no chest pain or dyspnea on exertion  Gastrointestinal ROS: no abdominal pain, change in bowel habits, or black or bloody stools  Genito-Urinary ROS: no dysuria, trouble voiding, or hematuria  Musculoskeletal ROS: negative  Neurological ROS: no TIA or stroke symptoms  Dermatological ROS: negative    VITALS:  Blood pressure 110/70, pulse 78, temperature 97.2 °F (36.2 °C), temperature source Infrared, weight 209 lb (94.8 kg), SpO2 97 %. Body mass index is 37.02 kg/m². Physical Examination:  General appearance - alert, well appearing, and in no distress  Mental status - alert, oriented to person, place, and time  Neck - Neck is supple, no JVD or carotid bruits. No thyromegaly or adenopathy. Chest - clear to auscultation, no wheezes, rales or rhonchi, symmetric air entry  Heart - normal rate, regular rhythm, normal S1, S2, no murmurs, rubs, clicks or gallops  Abdomen - soft, nontender, nondistended, no masses or organomegaly  Neurological - alert, oriented, normal speech, no focal findings or movement disorder noted  Extremities - peripheral pulses normal, no pedal edema, no clubbing or cyanosis  Skin - normal coloration and turgor, no rashes, no suspicious skin lesions noted      EKG: normal sinus rhythm, nonspecific ST and T waves changes, RBBB    No orders of the defined types were placed in this encounter. ASSESSMENT:     Diagnosis Orders   1. Coronary artery disease involving native coronary artery of native heart without angina pectoris     2. Essential hypertension     3. History of tobacco abuse     4. Mixed hyperlipidemia     5. Obesity (BMI 30-39. 9)           PLAN:     Patient will need to continue to follow up with you for their general medical care    As always, aggressive risk factor modification is strongly recommended. We should adhere to the JNC VIII guidelines for HTN management and the NCEP ATP III guidelines for LDL-C management.     Cardiac diet is always recommended with low fat, cholesterol, calories and sodium. Continue medications at current doses. Will continue to monitor patient clinically, if symptoms develop or worsen, they are to let me know ASAP or head to the nearest emergeny room. Patient was advised and encouraged to check blood pressure at home or at a pharmacy, maintain a logbook, and also call us back if blood pressure are above the target ranges or if it is low. Patient clearly understands and agrees to the instructions. We will need to continue to monitor muscle and liver enzymes, BUN, CR, and electrolytes. Details of medical condition explained and patient was warned about adverse consequences of uncontrolled medical conditions and possible side effects of prescribed medications.

## 2021-12-08 ENCOUNTER — OFFICE VISIT (OUTPATIENT)
Dept: PULMONOLOGY | Age: 66
End: 2021-12-08
Payer: COMMERCIAL

## 2021-12-08 VITALS
TEMPERATURE: 98.3 F | HEART RATE: 94 BPM | BODY MASS INDEX: 37.03 KG/M2 | WEIGHT: 209 LBS | SYSTOLIC BLOOD PRESSURE: 127 MMHG | HEIGHT: 63 IN | OXYGEN SATURATION: 97 % | DIASTOLIC BLOOD PRESSURE: 54 MMHG

## 2021-12-08 DIAGNOSIS — J44.9 CHRONIC OBSTRUCTIVE PULMONARY DISEASE, UNSPECIFIED COPD TYPE (HCC): Primary | ICD-10-CM

## 2021-12-08 DIAGNOSIS — E66.9 OBESITY (BMI 30-39.9): ICD-10-CM

## 2021-12-08 DIAGNOSIS — Z87.891 PERSONAL HISTORY OF TOBACCO USE: ICD-10-CM

## 2021-12-08 PROCEDURE — 99214 OFFICE O/P EST MOD 30 MIN: CPT | Performed by: INTERNAL MEDICINE

## 2021-12-08 ASSESSMENT — ENCOUNTER SYMPTOMS
DIARRHEA: 0
TROUBLE SWALLOWING: 0
SINUS PRESSURE: 0
WHEEZING: 0
COUGH: 0
EYE DISCHARGE: 0
RHINORRHEA: 0
VOMITING: 0
EYE ITCHING: 0
SHORTNESS OF BREATH: 1
SORE THROAT: 0
ABDOMINAL PAIN: 0
CHEST TIGHTNESS: 0
NAUSEA: 0
VOICE CHANGE: 0

## 2021-12-08 NOTE — PROGRESS NOTES
Subjective:     Eli Puentes is a 61 y.o. female who complains today of:     Chief Complaint   Patient presents with    COPD     3 month f/u       HPI  She is using She is on albuterol HFA  Prn  C/o shortness of breath  with exertion, climbing stair. .   No Wheezing   C/o Cough with clear  Sputum  No Hemoptysis  No Chest tightness   No Chest pain with radiation  or pleuritic pain  No Fever or chills. No Rhinorrhea and postnasal drip. She had PFT done moderate  Obstructive disease  and significant response to bronchodilator,   CXR no active disease.     21 UNREMARKABLE CT SCAN THE CHEST AS DESCRIBED ABOVE    Allergies:  Novocain [procaine]  Past Medical History:   Diagnosis Date    Borderline diabetes 2021    Class II obesity 2021    Coronary artery disease involving native coronary artery of native heart without angina pectoris 2021    Coronary artery disease involving native coronary artery of native heart without angina pectoris 2021    Essential hypertension 2021    Heart disease     History of tobacco abuse 2021    Hypertension      Past Surgical History:   Procedure Laterality Date    ABDOMEN SURGERY      rupture spleen    APPENDECTOMY      ENDOMETRIAL ABLATION      TUBAL LIGATION       Family History   Problem Relation Age of Onset    Cancer Maternal Grandmother     Breast Cancer Neg Hx     Colon Cancer Neg Hx     Eclampsia Neg Hx     Diabetes Neg Hx     Hypertension Neg Hx     Ovarian Cancer Neg Hx      Labor Neg Hx     Spont Abortions Neg Hx     Stroke Neg Hx      Social History     Socioeconomic History    Marital status: Single     Spouse name: Not on file    Number of children: Not on file    Years of education: Not on file    Highest education level: Not on file   Occupational History    Not on file   Tobacco Use    Smoking status: Never Smoker    Smokeless tobacco: Never Used   Substance and Sexual Activity    Alcohol use: Yes     Comment: rarely    Drug use: Never    Sexual activity: Not on file   Other Topics Concern    Not on file   Social History Narrative    Not on file     Social Determinants of Health     Financial Resource Strain:     Difficulty of Paying Living Expenses: Not on file   Food Insecurity:     Worried About Running Out of Food in the Last Year: Not on file    Pravin of Food in the Last Year: Not on file   Transportation Needs:     Lack of Transportation (Medical): Not on file    Lack of Transportation (Non-Medical): Not on file   Physical Activity:     Days of Exercise per Week: Not on file    Minutes of Exercise per Session: Not on file   Stress:     Feeling of Stress : Not on file   Social Connections:     Frequency of Communication with Friends and Family: Not on file    Frequency of Social Gatherings with Friends and Family: Not on file    Attends Faith Services: Not on file    Active Member of [address] [place] [address] or Organizations: Not on file    Attends Club or Organization Meetings: Not on file    Marital Status: Not on file   Intimate Partner Violence:     Fear of Current or Ex-Partner: Not on file    Emotionally Abused: Not on file    Physically Abused: Not on file    Sexually Abused: Not on file   Housing Stability:     Unable to Pay for Housing in the Last Year: Not on file    Number of [place] in the Last Year: Not on file    Unstable Housing in the Last Year: Not on file         Review of Systems   Constitutional: Negative for chills, diaphoresis, fatigue and fever. HENT: Negative for congestion, mouth sores, nosebleeds, postnasal drip, rhinorrhea, sinus pressure, sneezing, sore throat, trouble swallowing and voice change. Eyes: Negative for discharge, itching and visual disturbance. Respiratory: Positive for shortness of breath. Negative for cough, chest tightness and wheezing. Cardiovascular: Negative for chest pain, palpitations and leg swelling.    Gastrointestinal: Negative for abdominal pain, diarrhea, nausea and vomiting. Genitourinary: Negative for difficulty urinating and hematuria. Musculoskeletal: Negative for arthralgias, joint swelling and myalgias. Skin: Negative for rash. Allergic/Immunologic: Negative for environmental allergies and food allergies. Neurological: Negative for dizziness, tremors, weakness and headaches. Psychiatric/Behavioral: Negative for behavioral problems and sleep disturbance.         :     Vitals:    12/08/21 1014   BP: (!) 127/54   Pulse: 94   Temp: 98.3 °F (36.8 °C)   SpO2: 97%   Weight: 209 lb (94.8 kg)   Height: 5' 3\" (1.6 m)     Wt Readings from Last 3 Encounters:   12/08/21 209 lb (94.8 kg)   08/24/21 209 lb (94.8 kg)   02/17/21 211 lb (95.7 kg)         Physical Exam  Constitutional:       General: She is not in acute distress. Appearance: She is well-developed. She is not diaphoretic. HENT:      Head: Normocephalic and atraumatic. Nose: Nose normal.   Eyes:      Pupils: Pupils are equal, round, and reactive to light. Neck:      Thyroid: No thyromegaly. Vascular: No JVD. Trachea: No tracheal deviation. Cardiovascular:      Rate and Rhythm: Normal rate and regular rhythm. Heart sounds: No murmur heard. No friction rub. No gallop. Pulmonary:      Effort: No respiratory distress. Breath sounds: No wheezing or rales. Chest:      Chest wall: No tenderness. Abdominal:      General: There is no distension. Tenderness: There is no abdominal tenderness. There is no rebound. Musculoskeletal:         General: Normal range of motion. Lymphadenopathy:      Cervical: No cervical adenopathy. Skin:     General: Skin is warm and dry. Neurological:      Mental Status: She is alert and oriented to person, place, and time.       Coordination: Coordination normal.         Current Outpatient Medications   Medication Sig Dispense Refill    famotidine (PEPCID) 40 MG tablet take 1 tablet by mouth once daily if needed for HEARTBURN      isosorbide mononitrate (IMDUR) 30 MG extended release tablet TAKE 1 2 (ONE HALF) TABLET BY MOUTH ONCE DAILY FOR 30 DAYS 30 tablet 5    atorvastatin (LIPITOR) 20 MG tablet TAKE 1 TABLET BY MOUTH ONCE DAILY FOR 30 DAYS 30 tablet 5    metoprolol succinate (TOPROL XL) 25 MG extended release tablet TAKE 1 TABLET BY MOUTH ONCE DAILY FOR 30 DAYS 30 tablet 5    spironolactone (ALDACTONE) 25 MG tablet TAKE 1 TABLET BY MOUTH ONCE DAILY WITH FOOD FOR 30 DAYS 30 tablet 5    albuterol sulfate  (90 Base) MCG/ACT inhaler       losartan (COZAAR) 50 MG tablet TAKE 1 TABLET BY MOUTH ONCE DAILY      aspirin 81 MG EC tablet Take 81 mg by mouth daily      nitroGLYCERIN (NITROSTAT) 0.4 MG SL tablet Place 0.4 mg under the tongue every 5 minutes as needed for Chest pain up to max of 3 total doses. If no relief after 1 dose, call 911.  Biotin 5000 MCG TABS Take by mouth      calcium carbonate 600 MG TABS tablet Take 1 tablet by mouth daily      Chromium-Cinnamon (CINNAMON PLUS CHROMIUM)  MCG-MG CAPS Take by mouth      vitamin B-12 (CYANOCOBALAMIN) 1000 MCG tablet Take 1,000 mcg by mouth daily      Ascorbic Acid (VITAMIN C) 500 MG CAPS Take by mouth      Cholecalciferol (VITAMIN D3) 50 MCG (2000 UT) CAPS Take by mouth      clobetasol (TEMOVATE) 0.05 % ointment Apply to affected area BID x 2 wks 1 Tube 2    umeclidinium-vilanterol (ANORO ELLIPTA) 62.5-25 MCG/INH AEPB inhaler Inhale 1 puff into the lungs daily (Patient not taking: Reported on 12/8/2021) 30 puff 3     No current facility-administered medications for this visit. Results for orders placed during the hospital encounter of 03/05/21    XR CHEST (2 VW)    Narrative  EXAMINATION: XR CHEST (2 VW)    CLINICAL HISTORY: J44.9 Chronic obstructive pulmonary disease, unspecified COPD type ([facility] [address] ICD10    COMPARISONS: None    FINDINGS:    Two views of the chest are submitted.   The cardiac silhouette is of normal CXR no active disease. PFT done show  Moderate obstructive pulmonary disease. She said she never started using anoro ellipta . She said she will try to use it , she was worried about her blood count. 6/30/21 UNREMARKABLE CT SCAN THE CHEST AS DESCRIBED ABOVE    2. Obesity (BMI 30-39. 9)  She is advised try to lose weight. obesity related risk explained to the patient ,  Current weight:  209 lb (94.8 kg) Lbs. BMI:  Body mass index is 37.02 kg/m². Suggested weight control approaches, including dietary changes , exercise, behavioral modification. 3. Personal history of tobacco use, quit 2005  PFT done show  Moderate obstructive pulmonary disease. There is significant response to bronchodilators. Static lung volume study suggests air trapping and hyperinflation. Diffusion capacity within normal range. Airway resistance is increased. Flow volume loop suggests obstructive  pulmonary disease. Return in about 3 months (around 3/8/2022) for COPD.       Trace Alicea MD

## 2022-03-01 ENCOUNTER — OFFICE VISIT (OUTPATIENT)
Dept: CARDIOLOGY CLINIC | Age: 67
End: 2022-03-01
Payer: COMMERCIAL

## 2022-03-01 VITALS
DIASTOLIC BLOOD PRESSURE: 76 MMHG | SYSTOLIC BLOOD PRESSURE: 138 MMHG | TEMPERATURE: 97.2 F | HEART RATE: 68 BPM | WEIGHT: 214 LBS | BODY MASS INDEX: 37.91 KG/M2

## 2022-03-01 DIAGNOSIS — E78.2 MIXED HYPERLIPIDEMIA: ICD-10-CM

## 2022-03-01 DIAGNOSIS — Z87.891 PERSONAL HISTORY OF TOBACCO USE: ICD-10-CM

## 2022-03-01 DIAGNOSIS — E66.9 OBESITY (BMI 30-39.9): ICD-10-CM

## 2022-03-01 DIAGNOSIS — I25.10 CORONARY ARTERY DISEASE INVOLVING NATIVE CORONARY ARTERY OF NATIVE HEART WITHOUT ANGINA PECTORIS: Primary | ICD-10-CM

## 2022-03-01 DIAGNOSIS — I10 ESSENTIAL HYPERTENSION: ICD-10-CM

## 2022-03-01 PROBLEM — R94.39 CARDIOVASCULAR STRESS TEST ABNORMAL: Status: RESOLVED | Noted: 2021-02-02 | Resolved: 2022-03-01

## 2022-03-01 PROCEDURE — 93000 ELECTROCARDIOGRAM COMPLETE: CPT | Performed by: INTERNAL MEDICINE

## 2022-03-01 PROCEDURE — 99214 OFFICE O/P EST MOD 30 MIN: CPT | Performed by: INTERNAL MEDICINE

## 2022-03-01 NOTE — PROGRESS NOTES
Chief Complaint   Patient presents with    Coronary Artery Disease       2-2-21: Patient presents for initial medical evaluation. Patient is followed on a regular basis by Dr. Blanche Meeks DO. Patient recently moved from [place] [facility]. She was follow-up with cardiology and noted to have angina, status post abnormal stress test a couple years ago and underwent cardiac catheterization which showed 40% coronary artery disease stenosis. No PCI was performed. Patient also noted to have hypertension and placed on blood pressure medications. Pt denies chest pain, dyspnea, dyspnea on exertion, change in exercise capacity, fatigue,  nausea, vomiting, diarrhea, constipation, motor weakness, insomnia, weight loss, syncope, dizziness, lightheadedness, palpitations, PND, orthopnea, or claudication. EKG today with normal sinus rhythm, right bundle branch block, no evidence of any acute ischemia. She denies tobacco abuse. She states she has been diagnosed with borderline diabetes. Positive history of COPD, quit smoking a few years ago. 8-24-21: Pt denies chest pain,  fatigue,  nausea, vomiting, diarrhea, constipation, motor weakness, insomnia, weight loss, syncope, dizziness, lightheadedness, palpitations, PND, orthopnea, or claudication. No nitro use. BP and hr are good. CAD is stable. No LE discoloration or ulcers. No LE edema. No CHF type symptoms. Lipid profile is normal. No recent hospitalization. No change in meds. Does have baseline SOB. No smoking. Has lost a few pounds. No LE edema. She does have GERD and is on Pepcid. 3-1-22: hx of mild CAD. Pt denies chest pain, dyspnea, dyspnea on exertion, change in exercise capacity, fatigue,  nausea, vomiting, diarrhea, constipation, motor weakness, insomnia, weight loss, syncope, dizziness, lightheadedness, palpitations, PND, orthopnea, or claudication. No nitro use. BP and hr are good. CAD is stable. No LE discoloration or ulcers. No LE edema. No CHF type symptoms. Lipid profile is normal. No recent hospitalization. No change in meds. No smoking. Patient Active Problem List   Diagnosis    Hyperlipidemia    Gastrointestinal hemorrhage    Chronic obstructive pulmonary disease (HCC)    Adverse reaction to drug    Hx of colonic polyps    Arthritis    Coronary artery disease involving native coronary artery of native heart without angina pectoris    Essential hypertension    Borderline diabetes    Obesity (BMI 30-39. 9)    Personal history of tobacco use       Past Surgical History:   Procedure Laterality Date    ABDOMEN SURGERY      rupture spleen    APPENDECTOMY      ENDOMETRIAL ABLATION      TUBAL LIGATION         Social History     Socioeconomic History    Marital status: Single     Spouse name: Not on file    Number of children: Not on file    Years of education: Not on file    Highest education level: Not on file   Occupational History    Not on file   Tobacco Use    Smoking status: Never Smoker    Smokeless tobacco: Never Used   Substance and Sexual Activity    Alcohol use: Yes     Comment: rarely    Drug use: Never    Sexual activity: Not on file   Other Topics Concern    Not on file   Social History Narrative    Not on file     Social Determinants of Health     Financial Resource Strain:     Difficulty of Paying Living Expenses: Not on file   Food Insecurity:     Worried About Running Out of Food in the Last Year: Not on file    Pravin of Food in the Last Year: Not on file   Transportation Needs:     Lack of Transportation (Medical): Not on file    Lack of Transportation (Non-Medical):  Not on file   Physical Activity:     Days of Exercise per Week: Not on file    Minutes of Exercise per Session: Not on file   Stress:     Feeling of Stress : Not on file   Social Connections:     Frequency of Communication with Friends and Family: Not on file    Frequency of Social Gatherings with Friends and Family: Not on file    Attends Mosque Services: Not on file    Active Member of Clubs or Organizations: Not on file    Attends Club or Organization Meetings: Not on file    Marital Status: Not on file   Intimate Partner Violence:     Fear of Current or Ex-Partner: Not on file    Emotionally Abused: Not on file    Physically Abused: Not on file    Sexually Abused: Not on file   Housing Stability:     Unable to Pay for Housing in the Last Year: Not on file    Number of Places Lived in the Last Year: Not on file    Unstable Housing in the Last Year: Not on file       Family History   Problem Relation Age of Onset    Cancer Maternal Grandmother     Breast Cancer Neg Hx     Colon Cancer Neg Hx     Eclampsia Neg Hx     Diabetes Neg Hx     Hypertension Neg Hx     Ovarian Cancer Neg Hx      Labor Neg Hx     Spont Abortions Neg Hx     Stroke Neg Hx        Current Outpatient Medications   Medication Sig Dispense Refill    umeclidinium-vilanterol (ANORO ELLIPTA) 62.5-25 MCG/INH AEPB inhaler Inhale 1 puff into the lungs daily 1 each 3    famotidine (PEPCID) 40 MG tablet take 1 tablet by mouth once daily if needed for HEARTBURN      isosorbide mononitrate (IMDUR) 30 MG extended release tablet TAKE 1 2 (ONE HALF) TABLET BY MOUTH ONCE DAILY FOR 30 DAYS 30 tablet 5    atorvastatin (LIPITOR) 20 MG tablet TAKE 1 TABLET BY MOUTH ONCE DAILY FOR 30 DAYS 30 tablet 5    metoprolol succinate (TOPROL XL) 25 MG extended release tablet TAKE 1 TABLET BY MOUTH ONCE DAILY FOR 30 DAYS 30 tablet 5    spironolactone (ALDACTONE) 25 MG tablet TAKE 1 TABLET BY MOUTH ONCE DAILY WITH FOOD FOR 30 DAYS 30 tablet 5    albuterol sulfate  (90 Base) MCG/ACT inhaler       losartan (COZAAR) 50 MG tablet TAKE 1 TABLET BY MOUTH ONCE DAILY      aspirin 81 MG EC tablet Take 81 mg by mouth daily      nitroGLYCERIN (NITROSTAT) 0.4 MG SL tablet Place 0.4 mg under the tongue every 5 minutes as needed for Chest pain up to max of 3 total doses.  If no relief after 1 dose, call 911.  Biotin 5000 MCG TABS Take by mouth      calcium carbonate 600 MG TABS tablet Take 1 tablet by mouth daily      Chromium-Cinnamon (CINNAMON PLUS CHROMIUM)  MCG-MG CAPS Take by mouth      vitamin B-12 (CYANOCOBALAMIN) 1000 MCG tablet Take 1,000 mcg by mouth daily      Ascorbic Acid (VITAMIN C) 500 MG CAPS Take by mouth      Cholecalciferol (VITAMIN D3) 50 MCG (2000 UT) CAPS Take by mouth      clobetasol (TEMOVATE) 0.05 % ointment Apply to affected area BID x 2 wks 1 Tube 2     No current facility-administered medications for this visit. Novocain [procaine]    Review of Systems:  General ROS: negative  Psychological ROS: negative  Hematological and Lymphatic ROS: No history of blood clots or bleeding disorder. Respiratory ROS: no cough, shortness of breath, or wheezing  Cardiovascular ROS: no chest pain or dyspnea on exertion  Gastrointestinal ROS: no abdominal pain, change in bowel habits, or black or bloody stools  Genito-Urinary ROS: no dysuria, trouble voiding, or hematuria  Musculoskeletal ROS: negative  Neurological ROS: no TIA or stroke symptoms  Dermatological ROS: negative    VITALS:  Blood pressure 138/76, pulse 68, temperature 97.2 °F (36.2 °C), temperature source Infrared, weight 214 lb (97.1 kg). Body mass index is 37.91 kg/m². Physical Examination:  General appearance - alert, well appearing, and in no distress  Mental status - alert, oriented to person, place, and time  Neck - Neck is supple, no JVD or carotid bruits. No thyromegaly or adenopathy.    Chest - clear to auscultation, no wheezes, rales or rhonchi, symmetric air entry  Heart - normal rate, regular rhythm, normal S1, S2, no murmurs, rubs, clicks or gallops  Abdomen - soft, nontender, nondistended, no masses or organomegaly  Neurological - alert, oriented, normal speech, no focal findings or movement disorder noted  Extremities - peripheral pulses normal, no pedal edema, no clubbing or cyanosis  Skin - normal coloration and turgor, no rashes, no suspicious skin lesions noted      EKG: normal sinus rhythm, nonspecific ST and T waves changes, RBBB    Orders Placed This Encounter   Procedures    EKG 12 Lead       ASSESSMENT:     Diagnosis Orders   1. Coronary artery disease involving native coronary artery of native heart without angina pectoris  EKG 12 Lead   2. Obesity (BMI 30-39.9)     3. Mixed hyperlipidemia     4. Essential hypertension     5. Personal history of tobacco use           PLAN:     Patient will need to continue to follow up with you for their general medical care    As always, aggressive risk factor modification is strongly recommended. We should adhere to the JNC VIII guidelines for HTN management and the NCEP ATP III guidelines for LDL-C management. Cardiac diet is always recommended with low fat, cholesterol, calories and sodium. Continue medications at current doses. Check EKG    Will continue to monitor patient clinically, if symptoms develop or worsen, they are to let me know ASAP or head to the nearest emergeny room. Patient was advised and encouraged to check blood pressure at home or at a pharmacy, maintain a logbook, and also call us back if blood pressure are above the target ranges or if it is low. Patient clearly understands and agrees to the instructions. We will need to continue to monitor muscle and liver enzymes, BUN, CR, and electrolytes. Details of medical condition explained and patient was warned about adverse consequences of uncontrolled medical conditions and possible side effects of prescribed medications.

## 2022-03-07 ENCOUNTER — TELEPHONE (OUTPATIENT)
Dept: PULMONOLOGY | Age: 67
End: 2022-03-07

## 2022-03-18 ENCOUNTER — OFFICE VISIT (OUTPATIENT)
Dept: PULMONOLOGY | Age: 67
End: 2022-03-18
Payer: COMMERCIAL

## 2022-03-18 VITALS
HEART RATE: 84 BPM | HEIGHT: 63 IN | TEMPERATURE: 98.2 F | OXYGEN SATURATION: 97 % | BODY MASS INDEX: 38.45 KG/M2 | SYSTOLIC BLOOD PRESSURE: 133 MMHG | WEIGHT: 217 LBS | DIASTOLIC BLOOD PRESSURE: 74 MMHG

## 2022-03-18 DIAGNOSIS — Z87.891 PERSONAL HISTORY OF TOBACCO USE: ICD-10-CM

## 2022-03-18 DIAGNOSIS — J44.9 CHRONIC OBSTRUCTIVE PULMONARY DISEASE, UNSPECIFIED COPD TYPE (HCC): Primary | ICD-10-CM

## 2022-03-18 DIAGNOSIS — E66.9 OBESITY (BMI 30-39.9): ICD-10-CM

## 2022-03-18 PROCEDURE — 99214 OFFICE O/P EST MOD 30 MIN: CPT | Performed by: INTERNAL MEDICINE

## 2022-03-18 ASSESSMENT — ENCOUNTER SYMPTOMS
EYE DISCHARGE: 0
NAUSEA: 0
CHEST TIGHTNESS: 0
RHINORRHEA: 0
SHORTNESS OF BREATH: 1
COUGH: 1
SORE THROAT: 0
VOICE CHANGE: 0
TROUBLE SWALLOWING: 0
WHEEZING: 0
EYE ITCHING: 0
VOMITING: 0
SINUS PRESSURE: 0
DIARRHEA: 0
ABDOMINAL PAIN: 0

## 2022-03-18 NOTE — PROGRESS NOTES
Subjective:     Frank Hagan is a 61 y.o. female who complains today of:     Chief Complaint   Patient presents with    COPD     3 month f/u       HPI  She is using Anoro Ellipta 1 puff  but  Daily . She said she is on albuterol HFA  Prn bases  C/o shortness of breath , worse with exertion, climbing stair . No Wheezing. C/o Cough with clear white   Sputum. No Hemoptysis. No Chest tightness. No Chest pain with radiation  or pleuritic pain. No  leg edema. No orthopnea. No Fever or chills. No Rhinorrhea and postnasal drip. PFT moderate COPD with significant  Improvement with bronchodilator     Allergies:  Novocain [procaine]  Past Medical History:   Diagnosis Date    Borderline diabetes 2021    Class II obesity 2021    Coronary artery disease involving native coronary artery of native heart without angina pectoris 2021    Coronary artery disease involving native coronary artery of native heart without angina pectoris 2021    Essential hypertension 2021    Heart disease     History of tobacco abuse 2021    Hypertension      Past Surgical History:   Procedure Laterality Date    ABDOMEN SURGERY      rupture spleen    APPENDECTOMY      ENDOMETRIAL ABLATION      TUBAL LIGATION       Family History   Problem Relation Age of Onset    Cancer Maternal Grandmother     Breast Cancer Neg Hx     Colon Cancer Neg Hx     Eclampsia Neg Hx     Diabetes Neg Hx     Hypertension Neg Hx     Ovarian Cancer Neg Hx      Labor Neg Hx     Spont Abortions Neg Hx     Stroke Neg Hx      Social History     Socioeconomic History    Marital status: Single     Spouse name: Not on file    Number of children: Not on file    Years of education: Not on file    Highest education level: Not on file   Occupational History    Not on file   Tobacco Use    Smoking status: Never Smoker    Smokeless tobacco: Never Used   Substance and Sexual Activity    Alcohol use:  Yes Comment: rarely    Drug use: Never    Sexual activity: Not on file   Other Topics Concern    Not on file   Social History Narrative    Not on file     Social Determinants of Health     Financial Resource Strain:     Difficulty of Paying Living Expenses: Not on file   Food Insecurity:     Worried About Running Out of Food in the Last Year: Not on file    Pravin of Food in the Last Year: Not on file   Transportation Needs:     Lack of Transportation (Medical): Not on file    Lack of Transportation (Non-Medical): Not on file   Physical Activity:     Days of Exercise per Week: Not on file    Minutes of Exercise per Session: Not on file   Stress:     Feeling of Stress : Not on file   Social Connections:     Frequency of Communication with Friends and Family: Not on file    Frequency of Social Gatherings with Friends and Family: Not on file    Attends [employer] Services: Not on file    Active Member of [address] [place] [address] [employer] or Organizations: Not on file    Attends Club or Organization Meetings: Not on file    Marital Status: Not on file   Intimate Partner Violence:     Fear of Current or Ex-Partner: Not on file    Emotionally Abused: Not on file    Physically Abused: Not on file    Sexually Abused: Not on file   Housing Stability:     Unable to Pay for Housing in the Last Year: Not on file    Number of [place] in the Last Year: Not on file    Unstable Housing in the Last Year: Not on file         Review of Systems   Constitutional: Negative for chills, diaphoresis, fatigue and fever. HENT: Negative for congestion, mouth sores, nosebleeds, postnasal drip, rhinorrhea, sinus pressure, sneezing, sore throat, trouble swallowing and voice change. Eyes: Negative for discharge, itching and visual disturbance. Respiratory: Positive for cough and shortness of breath. Negative for chest tightness and wheezing. Cardiovascular: Negative for chest pain, palpitations and leg swelling.    Gastrointestinal: Negative for abdominal pain, diarrhea, nausea and vomiting. Genitourinary: Negative for difficulty urinating and hematuria. Musculoskeletal: Negative for arthralgias, joint swelling and myalgias. Skin: Negative for rash. Allergic/Immunologic: Negative for environmental allergies and food allergies. Neurological: Negative for dizziness, tremors, weakness and headaches. Psychiatric/Behavioral: Negative for behavioral problems and sleep disturbance.         :     Vitals:    03/18/22 0849   BP: 133/74   Pulse: 84   Temp: 98.2 °F (36.8 °C)   SpO2: 97%   Weight: 217 lb (98.4 kg)   Height: 5' 3\" (1.6 m)     Wt Readings from Last 3 Encounters:   03/18/22 217 lb (98.4 kg)   03/01/22 214 lb (97.1 kg)   12/08/21 209 lb (94.8 kg)         Physical Exam  Constitutional:       General: She is not in acute distress. Appearance: She is well-developed. She is obese. She is not diaphoretic. HENT:      Head: Normocephalic and atraumatic. Nose: Nose normal.   Eyes:      Pupils: Pupils are equal, round, and reactive to light. Neck:      Thyroid: No thyromegaly. Vascular: No JVD. Trachea: No tracheal deviation. Cardiovascular:      Rate and Rhythm: Normal rate and regular rhythm. Heart sounds: No murmur heard. No friction rub. No gallop. Pulmonary:      Effort: No respiratory distress. Breath sounds: No wheezing or rales. Comments: diminished Breath sound bilaterally. Chest:      Chest wall: No tenderness. Abdominal:      General: There is no distension. Tenderness: There is no abdominal tenderness. There is no rebound. Musculoskeletal:         General: Normal range of motion. Lymphadenopathy:      Cervical: No cervical adenopathy. Skin:     General: Skin is warm and dry. Neurological:      Mental Status: She is alert and oriented to person, place, and time.       Coordination: Coordination normal.         Current Outpatient Medications   Medication Sig Dispense Refill    umeclidinium-vilanterol (ANORO ELLIPTA) 62.5-25 MCG/INH AEPB inhaler Inhale 1 puff into the lungs daily 1 each 3    famotidine (PEPCID) 40 MG tablet take 1 tablet by mouth once daily if needed for HEARTBURN      isosorbide mononitrate (IMDUR) 30 MG extended release tablet TAKE 1 2 (ONE HALF) TABLET BY MOUTH ONCE DAILY FOR 30 DAYS 30 tablet 5    atorvastatin (LIPITOR) 20 MG tablet TAKE 1 TABLET BY MOUTH ONCE DAILY FOR 30 DAYS 30 tablet 5    metoprolol succinate (TOPROL XL) 25 MG extended release tablet TAKE 1 TABLET BY MOUTH ONCE DAILY FOR 30 DAYS 30 tablet 5    spironolactone (ALDACTONE) 25 MG tablet TAKE 1 TABLET BY MOUTH ONCE DAILY WITH FOOD FOR 30 DAYS 30 tablet 5    albuterol sulfate  (90 Base) MCG/ACT inhaler       losartan (COZAAR) 50 MG tablet TAKE 1 TABLET BY MOUTH ONCE DAILY      aspirin 81 MG EC tablet Take 81 mg by mouth daily      nitroGLYCERIN (NITROSTAT) 0.4 MG SL tablet Place 0.4 mg under the tongue every 5 minutes as needed for Chest pain up to max of 3 total doses. If no relief after 1 dose, call 911.  Biotin 5000 MCG TABS Take by mouth      calcium carbonate 600 MG TABS tablet Take 1 tablet by mouth daily      Chromium-Cinnamon (CINNAMON PLUS CHROMIUM)  MCG-MG CAPS Take by mouth      vitamin B-12 (CYANOCOBALAMIN) 1000 MCG tablet Take 1,000 mcg by mouth daily      Ascorbic Acid (VITAMIN C) 500 MG CAPS Take by mouth      Cholecalciferol (VITAMIN D3) 50 MCG (2000 UT) CAPS Take by mouth      clobetasol (TEMOVATE) 0.05 % ointment Apply to affected area BID x 2 wks 1 Tube 2     No current facility-administered medications for this visit. Results for orders placed during the hospital encounter of 03/05/21    XR CHEST (2 VW)    Narrative  EXAMINATION: XR CHEST (2 VW)    CLINICAL HISTORY: J44.9 Chronic obstructive pulmonary disease, unspecified COPD type ([facility] [address] ICD10    COMPARISONS: None    FINDINGS:    Two views of the chest are submitted.   The cardiac silhouette is of normal size configuration. Pulmonary vascular unremarkable. Right sided trachea. No focal infiltrates. No effusions. No Pneumothoraces. Impression  NO ACUTE ACTIVE CARDIOPULMONARY PROCESS    Results for orders placed during the hospital encounter of 06/30/21    CT CHEST W CONTRAST    Narrative  EXAMINATION:  CT SCAN CHEST    CLINICAL HISTORY:  Axillary adenopathy    COMPARISON:  None    TECHNIQUE:  Multiple serial axial images of the chest from the base neck through the upper abdomen with both sagittal coronal reconstruction was performed following the intravenous administration of 100 mL of Isovue-370. Markers were placed along the  right upper chest axillary region in patient's area of concern. FINDINGS:  There are no focal parenchymal abnormalities no pleural effusions. No pneumothoraces. No significant periaortic, pretracheal, parahilar or subcarinal adenopathy. Within the field-of-view no significant supraclavicular adenopathy. No significant left or right axillary adenopathy. There are bilateral small subcentimeter nonspecific axillary lymph nodes noted. The markers are along the right lateral lower chest wall. The skin does not show any thickening. There is subcutaneous inflammatory stranding. No masses. In the field-of-view visualized abdominal contents are unremarkable. Visualized osseous structures are intact    Impression  UNREMARKABLE CT SCAN THE CHEST AS DESCRIBED ABOVE      All CT scans at this facility use dose modulation, iterative reconstruction, and/or weight based dosing when appropriate to reduce radiation dose to as low as reasonably achievable. Assessment/Plan:     1. Chronic obstructive pulmonary disease, unspecified COPD type ([address]  She is using Anoro Ellipta 1 puff  but  Daily . She said she is on albuterol HFA  Prn bases  C/o shortness of breath , worse with exertion, climbing stair . No Wheezing. C/o Cough with clear white   Sputum. No Rhinorrhea and postnasal drip. PFT before next visit . CXR before next visit . PFT moderate COPD with significant  Improvement with bronchodilator. CXR no active disease. CT chest 6/21 UNREMARKABLE CT SCAN THE CHEST AS DESCRIBED ABOVE.    2. Obesity (BMI 30-39. 9)  She is advised try to lose weight. obesity related risk explained to the patient ,  Current weight:  217 lb (98.4 kg) Lbs. BMI:  Body mass index is 38.44 kg/m². Suggested weight control approaches, including dietary changes , exercise, behavioral modification. Return in about 4 months (around 7/18/2022) for COPD.       Leslie Damian MD

## 2022-03-28 ENCOUNTER — HOSPITAL ENCOUNTER (OUTPATIENT)
Dept: WOMENS IMAGING | Age: 67
Discharge: HOME | End: 2022-03-30
Payer: COMMERCIAL

## 2022-03-28 VITALS — BODY MASS INDEX: 38.09 KG/M2 | WEIGHT: 215 LBS | HEIGHT: 63 IN

## 2022-03-28 DIAGNOSIS — Z12.39 SCREENING BREAST EXAMINATION: ICD-10-CM

## 2022-03-28 DIAGNOSIS — Z13.820 SPECIAL SCREENING FOR OSTEOPOROSIS: ICD-10-CM

## 2022-03-28 PROCEDURE — 77080 DXA BONE DENSITY AXIAL: CPT

## 2022-03-28 PROCEDURE — 77063 BREAST TOMOSYNTHESIS BI: CPT

## 2022-05-10 ENCOUNTER — HOSPITAL ENCOUNTER (OUTPATIENT)
Dept: GENERAL RADIOLOGY | Age: 67
Discharge: HOME | End: 2022-05-12
Payer: COMMERCIAL

## 2022-05-10 ENCOUNTER — HOSPITAL ENCOUNTER (OUTPATIENT)
Dept: PULMONOLOGY | Age: 67
Discharge: HOME | End: 2022-05-10
Payer: COMMERCIAL

## 2022-05-10 DIAGNOSIS — J44.9 CHRONIC OBSTRUCTIVE PULMONARY DISEASE, UNSPECIFIED COPD TYPE (HCC): ICD-10-CM

## 2022-05-10 PROCEDURE — 94729 DIFFUSING CAPACITY: CPT

## 2022-05-10 PROCEDURE — 94726 PLETHYSMOGRAPHY LUNG VOLUMES: CPT

## 2022-05-10 PROCEDURE — 6360000002 HC RX W HCPCS: Performed by: INTERNAL MEDICINE

## 2022-05-10 PROCEDURE — 71046 X-RAY EXAM CHEST 2 VIEWS: CPT

## 2022-05-10 PROCEDURE — 94060 EVALUATION OF WHEEZING: CPT

## 2022-05-10 RX ORDER — ALBUTEROL SULFATE 2.5 MG/3ML
2.5 SOLUTION RESPIRATORY (INHALATION) ONCE
Status: COMPLETED | OUTPATIENT
Start: 2022-05-10 | End: 2022-05-10

## 2022-05-10 RX ADMIN — ALBUTEROL SULFATE 2.5 MG: 2.5 SOLUTION RESPIRATORY (INHALATION) at 09:02

## 2022-05-11 PROCEDURE — 94729 DIFFUSING CAPACITY: CPT | Performed by: INTERNAL MEDICINE

## 2022-05-11 PROCEDURE — 94060 EVALUATION OF WHEEZING: CPT | Performed by: INTERNAL MEDICINE

## 2022-05-11 PROCEDURE — 94726 PLETHYSMOGRAPHY LUNG VOLUMES: CPT | Performed by: INTERNAL MEDICINE

## 2022-05-11 NOTE — PROCEDURES
[address]                      [address], [address] [facility]                    PULMONARY FUNCTION  Ashlee Paddy   61 y.o.   female  Height 63 in  Weight 211 lb      Referring provider   Estela Tyler MD    Reading provider   Henrietta Verde MD    Test meets ATS criteria for acceptability and reproducibility Yes    Diagnosis: [place]: Yes  Cough   Yes, wheezing No  Smoking   quit 13 years ago    Spirometry   FVC            2.32 L   75%  Post bronchodilator 2.20 L  71% Change -5 %  FEV1          1.74 L  74%   Post bronchodilator  1.73 L  73%   Change -1%  FEV1/FVC  75  %             Post bronchodilator  78 %  COC71-14% 1.31 L  61%  Post bronchodilator  1.29 L  61%   Change -1%    Lung volume   SVC           2.36 L  76%   RV              2.54 L 125%   TLC            4.90  L 99%   RV/TLC      51 %    DLCO           104 %     Test interpretation     Test meets ATS criteria for mild obstructive airway disease with no significant sounds to bronchodilator  Lung volume shows air trapping  Diffusion capacity is normal    Clinical correlation is recommended     Henrietta Verde MD [facility], 5/11/2022 4:31 PM

## 2022-07-06 ENCOUNTER — OFFICE VISIT (OUTPATIENT)
Dept: ORTHOPEDIC SURGERY | Age: 67
End: 2022-07-06
Payer: COMMERCIAL

## 2022-07-06 VITALS
OXYGEN SATURATION: 97 % | BODY MASS INDEX: 38.09 KG/M2 | HEART RATE: 72 BPM | WEIGHT: 215 LBS | TEMPERATURE: 97 F | HEIGHT: 63 IN

## 2022-07-06 DIAGNOSIS — S52.572A OTHER CLOSED INTRA-ARTICULAR FRACTURE OF DISTAL END OF LEFT RADIUS, INITIAL ENCOUNTER: Primary | ICD-10-CM

## 2022-07-06 PROCEDURE — 99203 OFFICE O/P NEW LOW 30 MIN: CPT | Performed by: PHYSICIAN ASSISTANT

## 2022-07-06 PROCEDURE — 25600 CLTX DST RDL FX/EPHYS SEP WO: CPT | Performed by: PHYSICIAN ASSISTANT

## 2022-07-06 ASSESSMENT — ENCOUNTER SYMPTOMS
EYES NEGATIVE: 1
RESPIRATORY NEGATIVE: 1
GASTROINTESTINAL NEGATIVE: 1

## 2022-07-06 NOTE — PROGRESS NOTES
Miguel Zambrano (:  1955) is a 61 y.o. female,New patient, here for evaluation of the following chief complaint(s):  Wrist Pain (The patient c/o left wrist pain. She states that she fell off of a ladder while cleaning and missed a step. )         ASSESSMENT/PLAN:  1. Other closed intra-articular fracture of distal end of left radius, initial encounter      No follow-ups on file. Subjective   SUBJECTIVE/OBJECTIVE:  This is a 62-year-old right-hand-dominant female with complaint of left wrist pain. She states 2022 she was coming down off a stepstool after cleaning the top of her refrigerator when she fell landing on her left arm. She was seen in emergency department 2022 where she was diagnosed with a nondisplaced fracture of the distal radius which is intra-articular. She has been wrapped in a brace. He denies change in sensation. Review of Systems   Constitutional: Negative. HENT: Negative. Eyes: Negative. Respiratory: Negative. Gastrointestinal: Negative. Genitourinary: Negative. Musculoskeletal: Negative. Psychiatric/Behavioral: Negative.            Objective    X-rays of the left wrist performed 2022 at the [facility] clinic shows:  Dionne Harris - 2022 11:07 AM EDT    * * *Final Report* * *    DATE OF EXAM: 2022 10:55AM      VHX   [id number]  -  XR WRIST 4V PA/LAT/OBL/SCAPH LT  / ACCESSION #  [id number]    PROCEDURE REASON: Fracture, wrist        * * * * Physician Interpretation * * * *    XR WRIST 4V PA/LAT/OBL/SCAPH LT    PROVIDED HISTORY: Fracture, wrist    COMPARISON: No previous similar exams are available for comparison    TECHNIQUE: 4 views of the left wrist    RESULT: A minimally displaced fracture of the distal radius is noted   extending to the radiocarpal joint space.  The carpal bone alignment   appears intact.  No radiopaque foreign bodies are seen.         XR WRIST INJURY 4V PA/LAT/OBL/SCAPH LEFT (2022 10:59 AM EDT)   Procedure Note       Physical Exam  Musculoskeletal:      Comments: Left wrist-diffuse swelling at the distal radius. Tenderness with palpation over the distal radius. Decreased flexion and extension range of motion at the left wrist.  Hand grasp is slightly decreased in comparison to the right. Sensation is intact distally to light touch. Capillary refill is brisk. Explained to the patient that she does have a nondisplaced fracture of the distal radius. She is almost 2 weeks status post injury date. She has been splinted. Recommended she go into a fiberglass cast.  He is taking Tylenol for pain. I will see her back in 3 weeks we will remove the cast and hopefully transition her to a Velcro wrist brace. On this date 7/6/2022 I have spent 35 minutes reviewing previous notes, test results and face to face with the patient discussing the diagnosis and importance of compliance with the treatment plan as well as documenting on the day of the visit. An electronic signature was used to authenticate this note.     --RAMESH Arrington

## 2022-07-21 ENCOUNTER — OFFICE VISIT (OUTPATIENT)
Dept: PULMONOLOGY | Age: 67
End: 2022-07-21
Payer: COMMERCIAL

## 2022-07-21 VITALS
SYSTOLIC BLOOD PRESSURE: 123 MMHG | OXYGEN SATURATION: 97 % | HEART RATE: 80 BPM | BODY MASS INDEX: 38.45 KG/M2 | WEIGHT: 217 LBS | DIASTOLIC BLOOD PRESSURE: 61 MMHG | HEIGHT: 63 IN

## 2022-07-21 DIAGNOSIS — J44.9 CHRONIC OBSTRUCTIVE PULMONARY DISEASE, UNSPECIFIED COPD TYPE (HCC): Primary | ICD-10-CM

## 2022-07-21 DIAGNOSIS — Z87.891 PERSONAL HISTORY OF TOBACCO USE: ICD-10-CM

## 2022-07-21 DIAGNOSIS — E66.9 OBESITY (BMI 30-39.9): ICD-10-CM

## 2022-07-21 PROCEDURE — 99214 OFFICE O/P EST MOD 30 MIN: CPT | Performed by: INTERNAL MEDICINE

## 2022-07-21 ASSESSMENT — ENCOUNTER SYMPTOMS
EYE DISCHARGE: 0
RHINORRHEA: 0
NAUSEA: 0
SINUS PRESSURE: 0
SORE THROAT: 0
VOICE CHANGE: 0
ABDOMINAL PAIN: 0
SHORTNESS OF BREATH: 1
COUGH: 1
CHEST TIGHTNESS: 0
TROUBLE SWALLOWING: 0
EYE ITCHING: 0
VOMITING: 0
WHEEZING: 0
DIARRHEA: 0

## 2022-07-21 NOTE — PROGRESS NOTES
Subjective:     Navjot Hinkle is a 61 y.o. female who complains today of:     Chief Complaint   Patient presents with    COPD     4 month f/u       HPI  She is using She is on anoro ellipta 1 puff daily albuterol HFA  Prn  C/o shortness of breath  with exertion, climbing stair. No Wheezing   C/o Cough , dry  No Hemoptysis  No Chest tightness  No Chest pain with radiation  or pleuritic pain  No Fever or chills. No Rhinorrhea and postnasal drip. She had PFT done mild obstructive disease  CXR no active disease.      21 UNREMARKABLE CT SCAN THE CHEST AS DESCRIBED ABOVE    Allergies:  Codeine and Novocain [procaine]  Past Medical History:   Diagnosis Date    Borderline diabetes 2021    Class II obesity 2021    Coronary artery disease involving native coronary artery of native heart without angina pectoris 2021    Coronary artery disease involving native coronary artery of native heart without angina pectoris 2021    Essential hypertension 2021    Heart disease     History of tobacco abuse 2021    Hypertension      Past Surgical History:   Procedure Laterality Date    ABDOMEN SURGERY      rupture spleen    APPENDECTOMY      ENDOMETRIAL ABLATION      TUBAL LIGATION       Family History   Problem Relation Age of Onset    Cancer Maternal Grandmother     Breast Cancer Neg Hx     Colon Cancer Neg Hx     Eclampsia Neg Hx     Diabetes Neg Hx     Hypertension Neg Hx     Ovarian Cancer Neg Hx      Labor Neg Hx     Spont Abortions Neg Hx     Stroke Neg Hx      Social History     Socioeconomic History    Marital status: Single     Spouse name: Not on file    Number of children: Not on file    Years of education: Not on file    Highest education level: Not on file   Occupational History    Not on file   Tobacco Use    Smoking status: Never    Smokeless tobacco: Never   Substance and Sexual Activity    Alcohol use: Yes     Comment: rarely    Drug use: Never    Sexual activity: Not on file   Other Topics Concern    Not on file   Social History Narrative    Not on file     Social Determinants of Health     Financial Resource Strain: Not on file   Food Insecurity: Not on file   Transportation Needs: Not on file   Physical Activity: Not on file   Stress: Not on file   Social Connections: Not on file   Intimate Partner Violence: Not on file   Housing Stability: Not on file         Review of Systems   Constitutional:  Negative for chills, diaphoresis, fatigue and fever. HENT:  Negative for congestion, mouth sores, nosebleeds, postnasal drip, rhinorrhea, sinus pressure, sneezing, sore throat, trouble swallowing and voice change. Eyes:  Negative for discharge, itching and visual disturbance. Respiratory:  Positive for cough and shortness of breath. Negative for chest tightness and wheezing. Cardiovascular:  Negative for chest pain, palpitations and leg swelling. Gastrointestinal:  Negative for abdominal pain, diarrhea, nausea and vomiting. Genitourinary:  Negative for difficulty urinating and hematuria. Musculoskeletal:  Negative for arthralgias, joint swelling and myalgias. Skin:  Negative for rash. Allergic/Immunologic: Negative for environmental allergies and food allergies. Neurological:  Negative for dizziness, tremors, weakness and headaches. Psychiatric/Behavioral:  Negative for behavioral problems and sleep disturbance.        :     Vitals:    07/21/22 0919   BP: 123/61   Site: Right Upper Arm   Position: Sitting   Cuff Size: Medium Adult   Pulse: 80   SpO2: 97%   Weight: 217 lb (98.4 kg)   Height: 5' 3\" (1.6 m)     Wt Readings from Last 3 Encounters:   07/21/22 217 lb (98.4 kg)   07/06/22 215 lb (97.5 kg)   03/28/22 215 lb (97.5 kg)         Physical Exam  Constitutional:       General: She is not in acute distress. Appearance: She is well-developed. She is obese. She is not diaphoretic. HENT:      Head: Normocephalic and atraumatic.       Nose: Nose normal.   Eyes: Pupils: Pupils are equal, round, and reactive to light. Neck:      Thyroid: No thyromegaly. Vascular: No JVD. Trachea: No tracheal deviation. Cardiovascular:      Rate and Rhythm: Normal rate and regular rhythm. Heart sounds: No murmur heard. No friction rub. No gallop. Pulmonary:      Effort: No respiratory distress. Breath sounds: No wheezing or rales. Chest:      Chest wall: No tenderness. Abdominal:      General: There is no distension. Tenderness: There is no abdominal tenderness. There is no rebound. Musculoskeletal:         General: Normal range of motion. Lymphadenopathy:      Cervical: No cervical adenopathy. Skin:     General: Skin is warm and dry. Neurological:      Mental Status: She is alert and oriented to person, place, and time.       Coordination: Coordination normal.   Psychiatric:         Mood and Affect: Mood normal.         Behavior: Behavior normal.       Current Outpatient Medications   Medication Sig Dispense Refill    umeclidinium-vilanterol (ANORO ELLIPTA) 62.5-25 MCG/INH AEPB inhaler Inhale 1 puff into the lungs daily 1 each 3    famotidine (PEPCID) 40 MG tablet take 1 tablet by mouth once daily if needed for HEARTBURN      isosorbide mononitrate (IMDUR) 30 MG extended release tablet TAKE 1 2 (ONE HALF) TABLET BY MOUTH ONCE DAILY FOR 30 DAYS 30 tablet 5    atorvastatin (LIPITOR) 20 MG tablet TAKE 1 TABLET BY MOUTH ONCE DAILY FOR 30 DAYS 30 tablet 5    metoprolol succinate (TOPROL XL) 25 MG extended release tablet TAKE 1 TABLET BY MOUTH ONCE DAILY FOR 30 DAYS 30 tablet 5    spironolactone (ALDACTONE) 25 MG tablet TAKE 1 TABLET BY MOUTH ONCE DAILY WITH FOOD FOR 30 DAYS 30 tablet 5    albuterol sulfate  (90 Base) MCG/ACT inhaler       losartan (COZAAR) 50 MG tablet TAKE 1 TABLET BY MOUTH ONCE DAILY      aspirin 81 MG EC tablet Take 81 mg by mouth daily      Biotin 5000 MCG TABS Take by mouth      calcium carbonate 600 MG TABS tablet Take 1 tablet by mouth daily      Chromium-Cinnamon (CINNAMON PLUS CHROMIUM)  MCG-MG CAPS Take by mouth      vitamin B-12 (CYANOCOBALAMIN) 1000 MCG tablet Take 1,000 mcg by mouth daily      Ascorbic Acid (VITAMIN C) 500 MG CAPS Take by mouth      Cholecalciferol (VITAMIN D3) 50 MCG (2000 UT) CAPS Take by mouth      clobetasol (TEMOVATE) 0.05 % ointment Apply to affected area BID x 2 wks 1 Tube 2    nitroGLYCERIN (NITROSTAT) 0.4 MG SL tablet Place 0.4 mg under the tongue every 5 minutes as needed for Chest pain up to max of 3 total doses. If no relief after 1 dose, call 911. (Patient not taking: Reported on 7/21/2022)       No current facility-administered medications for this visit.      [address]                      [address] [facility], [address] [facility]                    PULMONARY FUNCTION  Micheline Larve   61 y.o.   female  Height 63 in  Weight 211 lb        Referring provider   Cande Montes MD     Reading provider   Braden Kaba MD     Test meets ATS criteria for acceptability and reproducibility Yes     Diagnosis: [place]: Yes  Cough   Yes, wheezing No  Smoking   quit 13 years ago     Spirometry  FVC            2.32 L   75%  Post bronchodilator 2.20 L  71% Change -5 %  FEV1          1.74 L  74%   Post bronchodilator  1.73 L  73%   Change -1%  FEV1/FVC  75  %             Post bronchodilator  78 %  WUR78-83% 1.31 L  61%  Post bronchodilator  1.29 L  61%   Change -1%     Lung volume  SVC           2.36 L  76%  RV              2.54 L 125%  TLC            4.90  L 99%  RV/TLC      51 %     DLCO           104 %     Test interpretation     Test meets ATS criteria for mild obstructive airway disease with no significant sounds to bronchodilator  Lung volume shows air trapping  Diffusion capacity is normal     Clinical correlation is recommended      Braden Kaba MD [facility], 5/11/2022 4:31 PM  Results for orders placed during the hospital encounter of 05/10/22    XR CHEST (2 VW)    Narrative  Exam: XR CHEST (2 VW)    CLINICAL HISTORY: J44.9 Chronic obstructive pulmonary disease, unspecified COPD type ([address] ICD10    COMPARISON: None    CHEST X-ray, 2 VIEWS    FINDINGS:      The cardiomediastinal silhouette is within normal limits. There are no infiltrates, consolidations or effusions. .  Bones of the thorax appear intact. Impression  No radiographic evidence of acute intrathoracic process. Results for orders placed during the hospital encounter of 03/05/21    XR CHEST (2 VW)    Narrative  EXAMINATION: XR CHEST (2 VW)    CLINICAL HISTORY: J44.9 Chronic obstructive pulmonary disease, unspecified COPD type ([address] ICD10    COMPARISONS: None    FINDINGS:    Two views of the chest are submitted. The cardiac silhouette is of normal size configuration. Pulmonary vascular unremarkable. Right sided trachea. No focal infiltrates. No effusions. No Pneumothoraces. Impression  NO ACUTE ACTIVE CARDIOPULMONARY PROCESS  ]  Results for orders placed during the hospital encounter of 06/30/21    CT CHEST W CONTRAST    Narrative  EXAMINATION:  CT SCAN CHEST    CLINICAL HISTORY:  Axillary adenopathy    COMPARISON:  None    TECHNIQUE:  Multiple serial axial images of the chest from the base neck through the upper abdomen with both sagittal coronal reconstruction was performed following the intravenous administration of 100 mL of Isovue-370. Markers were placed along the  right upper chest axillary region in patient's area of concern. FINDINGS:  There are no focal parenchymal abnormalities no pleural effusions. No pneumothoraces. No significant periaortic, pretracheal, parahilar or subcarinal adenopathy. Within the field-of-view no significant supraclavicular adenopathy. No significant left or right axillary adenopathy. There are bilateral small subcentimeter nonspecific axillary lymph nodes noted. The markers are along the right lateral lower chest wall.  The skin does not show any thickening. There is subcutaneous inflammatory stranding. No masses. In the field-of-view visualized abdominal contents are unremarkable. Visualized osseous structures are intact    Impression  UNREMARKABLE CT SCAN THE CHEST AS DESCRIBED ABOVE      All CT scans at this facility use dose modulation, iterative reconstruction, and/or weight based dosing when appropriate to reduce radiation dose to as low as reasonably achievable.  ]    Assessment/Plan:     1. Chronic obstructive pulmonary disease, unspecified COPD type ([address]  She is using She is on anoro ellipta 1 puff daily albuterol HFA  PrnC/o shortness of breath  with exertion, climbing stair. No Wheezing C/o Cough , dry. Continue bronchodilator therapy as before. She had PFT done on 5/10/2022 mild obstructive disease. CXR no active disease. 6/30/21 UNREMARKABLE CT SCAN THE CHEST AS DESCRIBED ABOVE    2. Obesity (BMI 30-39. 9)  She is advised try to lose weight. obesity related risk explained to the patient ,  Current weight:  217 lb (98.4 kg) Lbs. BMI:  Body mass index is 38.44 kg/m². Suggested weight control approaches, including dietary changes , exercise, behavioral modification. Return in about 4 months (around 11/21/2022) for COPD.       Kobi Frank MD

## 2022-07-26 ENCOUNTER — OFFICE VISIT (OUTPATIENT)
Dept: ORTHOPEDIC SURGERY | Age: 67
End: 2022-07-26

## 2022-07-26 VITALS
HEART RATE: 67 BPM | OXYGEN SATURATION: 97 % | WEIGHT: 217 LBS | TEMPERATURE: 97.3 F | HEIGHT: 63 IN | BODY MASS INDEX: 38.45 KG/M2

## 2022-07-26 DIAGNOSIS — S62.102D CLOSED FRACTURE OF LEFT WRIST WITH ROUTINE HEALING, SUBSEQUENT ENCOUNTER: Primary | ICD-10-CM

## 2022-07-26 PROCEDURE — 99024 POSTOP FOLLOW-UP VISIT: CPT | Performed by: PHYSICIAN ASSISTANT

## 2022-07-26 ASSESSMENT — ENCOUNTER SYMPTOMS
GASTROINTESTINAL NEGATIVE: 1
EYES NEGATIVE: 1
RESPIRATORY NEGATIVE: 1

## 2022-07-26 NOTE — PROGRESS NOTES
Miguel Zambrano (:  1955) is a 61 y.o. female,Established patient, here for evaluation of the following chief complaint(s):  Follow-up (Other closed intra-articular fracture of distal end of left radius, initial encounter. Pt states she having soreness & some throbbing. )         ASSESSMENT/PLAN:  1. Closed fracture of left wrist with routine healing, subsequent encounter  -     XR WRIST LEFT (MIN 3 VIEWS); Future      No follow-ups on file. Subjective   SUBJECTIVE/OBJECTIVE:  This is a 42-year-old right-hand-dominant female with complaint of left wrist pain. She is 1 month out of distal radius fracture. Been in a fiberglass cast.  She states her pain is minimal.  She states her wrist feels stiff after coming out of the cast.  She denies change in sensation of the hand. Review of Systems   Constitutional: Negative. HENT: Negative. Eyes: Negative. Respiratory: Negative. Gastrointestinal: Negative. Genitourinary: Negative. Musculoskeletal: Negative. Psychiatric/Behavioral: Negative. Objective     Right wrist x-rays performed today show a healing distal radius fracture with an intra-articular component that has not shifted since previous x-rays 2022. Physical Exam  Constitutional:       Appearance: Normal appearance. HENT:      Head: Normocephalic and atraumatic. Mouth/Throat:      Mouth: Mucous membranes are moist.   Eyes:      Extraocular Movements: Extraocular movements intact. Musculoskeletal:      Cervical back: Normal range of motion. Comments: Left wrist-no break in the skin. No blistering. Very little tenderness with palpation at the distal radius dorsally. Pain with radial deviation greater than ulnar deviation. Decreased flexion and extension range of motion. Anatomical snuffbox is nontender. Sensation is intact distally to light touch. Skin:     General: Skin is warm and dry.    Neurological:      General: No focal deficit present. Mental Status: She is oriented to person, place, and time. Psychiatric:         Mood and Affect: Mood normal.     Explained to the patient that her distal radius fracture is healing well. She may come out of her fiberglass cast and go into a Velcro wrist brace. She will wear the Velcro wrist brace 24/7 for the next week only removing it to bathe. She may then take the brace off when she is sleeping but continue wearing the wrist brace when she is out of the house. Follow-up with me in a month. She will begin occupational therapy to improve her range of motion. On this date 7/26/2022 I have spent 25 minutes reviewing previous notes, test results and face to face with the patient discussing the diagnosis and importance of compliance with the treatment plan as well as documenting on the day of the visit. An electronic signature was used to authenticate this note.     --RAMESH Sumner

## 2023-02-21 ENCOUNTER — OFFICE VISIT (OUTPATIENT)
Dept: PULMONOLOGY | Age: 68
End: 2023-02-21
Payer: MEDICARE

## 2023-02-21 VITALS
BODY MASS INDEX: 38.26 KG/M2 | SYSTOLIC BLOOD PRESSURE: 116 MMHG | OXYGEN SATURATION: 96 % | HEART RATE: 67 BPM | WEIGHT: 216 LBS | DIASTOLIC BLOOD PRESSURE: 76 MMHG

## 2023-02-21 DIAGNOSIS — E66.9 OBESITY (BMI 30-39.9): ICD-10-CM

## 2023-02-21 DIAGNOSIS — J44.9 CHRONIC OBSTRUCTIVE PULMONARY DISEASE, UNSPECIFIED COPD TYPE (HCC): Primary | ICD-10-CM

## 2023-02-21 DIAGNOSIS — Z87.891 PERSONAL HISTORY OF TOBACCO USE: ICD-10-CM

## 2023-02-21 PROCEDURE — 1090F PRES/ABSN URINE INCON ASSESS: CPT | Performed by: INTERNAL MEDICINE

## 2023-02-21 PROCEDURE — G8427 DOCREV CUR MEDS BY ELIG CLIN: HCPCS | Performed by: INTERNAL MEDICINE

## 2023-02-21 PROCEDURE — 1036F TOBACCO NON-USER: CPT | Performed by: INTERNAL MEDICINE

## 2023-02-21 PROCEDURE — 3017F COLORECTAL CA SCREEN DOC REV: CPT | Performed by: INTERNAL MEDICINE

## 2023-02-21 PROCEDURE — G8484 FLU IMMUNIZE NO ADMIN: HCPCS | Performed by: INTERNAL MEDICINE

## 2023-02-21 PROCEDURE — 3023F SPIROM DOC REV: CPT | Performed by: INTERNAL MEDICINE

## 2023-02-21 PROCEDURE — 99214 OFFICE O/P EST MOD 30 MIN: CPT | Performed by: INTERNAL MEDICINE

## 2023-02-21 PROCEDURE — 3078F DIAST BP <80 MM HG: CPT | Performed by: INTERNAL MEDICINE

## 2023-02-21 PROCEDURE — 1123F ACP DISCUSS/DSCN MKR DOCD: CPT | Performed by: INTERNAL MEDICINE

## 2023-02-21 PROCEDURE — G8417 CALC BMI ABV UP PARAM F/U: HCPCS | Performed by: INTERNAL MEDICINE

## 2023-02-21 PROCEDURE — 3074F SYST BP LT 130 MM HG: CPT | Performed by: INTERNAL MEDICINE

## 2023-02-21 PROCEDURE — G8399 PT W/DXA RESULTS DOCUMENT: HCPCS | Performed by: INTERNAL MEDICINE

## 2023-02-21 RX ORDER — UMECLIDINIUM BROMIDE AND VILANTEROL TRIFENATATE 62.5; 25 UG/1; UG/1
1 POWDER RESPIRATORY (INHALATION) DAILY
Qty: 2 EACH | Refills: 0 | COMMUNITY
Start: 2023-02-21

## 2023-02-21 ASSESSMENT — ENCOUNTER SYMPTOMS
CHEST TIGHTNESS: 0
COUGH: 0
VOICE CHANGE: 0
WHEEZING: 0
NAUSEA: 0
SINUS PRESSURE: 0
EYE DISCHARGE: 0
RHINORRHEA: 0
SORE THROAT: 0
VOMITING: 0
DIARRHEA: 0
ABDOMINAL PAIN: 0
SHORTNESS OF BREATH: 1
TROUBLE SWALLOWING: 0
EYE ITCHING: 0

## 2023-02-21 NOTE — PROGRESS NOTES
Subjective:     Mal Espino is a 74 y.o. female who complains today of:     Chief Complaint   Patient presents with    Follow-up     6m f/u for COPD        HPI  She is using She is on anoro ellipta 1 puff daily, stop using it she need sample . She is using  albuterol HFA  Prn  C/o shortness of breath  with exertion, climbing stair. No Wheezing. C/o Cough , dry. No Hemoptysis  No Chest tightness  No Chest pain with radiation  or pleuritic pain  No Fever or chills. No Rhinorrhea and postnasal drip. She had PFT  done mild obstructive disease  CXR  no active disease.        Allergies:  Codeine and Novocain [procaine]  Past Medical History:   Diagnosis Date    Borderline diabetes 2021    Class II obesity 2021    Coronary artery disease involving native coronary artery of native heart without angina pectoris 2021    Coronary artery disease involving native coronary artery of native heart without angina pectoris 2021    Essential hypertension 2021    Heart disease     History of tobacco abuse 2021    Hypertension      Past Surgical History:   Procedure Laterality Date    ABDOMEN SURGERY      rupture spleen    APPENDECTOMY      ENDOMETRIAL ABLATION      TUBAL LIGATION       Family History   Problem Relation Age of Onset    Cancer Maternal Grandmother     Breast Cancer Neg Hx     Colon Cancer Neg Hx     Eclampsia Neg Hx     Diabetes Neg Hx     Hypertension Neg Hx     Ovarian Cancer Neg Hx      Labor Neg Hx     Spont Abortions Neg Hx     Stroke Neg Hx      Social History     Socioeconomic History    Marital status: Single     Spouse name: Not on file    Number of children: Not on file    Years of education: Not on file    Highest education level: Not on file   Occupational History    Not on file   Tobacco Use    Smoking status: Never    Smokeless tobacco: Never   Substance and Sexual Activity    Alcohol use: Yes     Comment: rarely    Drug use: Never    Sexual activity: Not on file   Other Topics Concern    Not on file   Social History Narrative    Not on file     Social Determinants of Health     Financial Resource Strain: Not on file   Food Insecurity: Not on file   Transportation Needs: Not on file   Physical Activity: Not on file   Stress: Not on file   Social Connections: Not on file   Intimate Partner Violence: Not on file   Housing Stability: Not on file         Review of Systems   Constitutional:  Negative for chills, diaphoresis, fatigue and fever. HENT:  Negative for congestion, mouth sores, nosebleeds, postnasal drip, rhinorrhea, sinus pressure, sneezing, sore throat, trouble swallowing and voice change. Eyes:  Negative for discharge, itching and visual disturbance. Respiratory:  Positive for shortness of breath. Negative for cough, chest tightness and wheezing. Cardiovascular:  Negative for chest pain, palpitations and leg swelling. Gastrointestinal:  Negative for abdominal pain, diarrhea, nausea and vomiting. Genitourinary:  Negative for difficulty urinating and hematuria. Musculoskeletal:  Negative for arthralgias, joint swelling and myalgias. Skin:  Negative for rash. Allergic/Immunologic: Negative for environmental allergies and food allergies. Neurological:  Negative for dizziness, tremors, weakness and headaches. Psychiatric/Behavioral:  Negative for behavioral problems and sleep disturbance.        :     Vitals:    02/21/23 0844   BP: 116/76   Site: Right Lower Arm   Position: Sitting   Cuff Size: Medium Adult   Pulse: 67   SpO2: 96%   Weight: 216 lb (98 kg)     Wt Readings from Last 3 Encounters:   02/21/23 216 lb (98 kg)   07/26/22 217 lb (98.4 kg)   07/21/22 217 lb (98.4 kg)         Physical Exam  Constitutional:       General: She is not in acute distress. Appearance: She is well-developed. She is not diaphoretic. HENT:      Head: Normocephalic and atraumatic.       Nose: Nose normal.   Eyes:      Pupils: Pupils are equal, round, and reactive to light. Neck:      Thyroid: No thyromegaly. Vascular: No JVD. Trachea: No tracheal deviation. Cardiovascular:      Rate and Rhythm: Normal rate and regular rhythm. Heart sounds: No murmur heard. No friction rub. No gallop. Pulmonary:      Effort: No respiratory distress. Breath sounds: No wheezing or rales. Comments: diminished Breath sound bilaterally. Chest:      Chest wall: No tenderness. Abdominal:      General: There is no distension. Tenderness: There is no abdominal tenderness. There is no rebound. Musculoskeletal:         General: Normal range of motion. Lymphadenopathy:      Cervical: No cervical adenopathy. Skin:     General: Skin is warm and dry. Neurological:      Mental Status: She is alert and oriented to person, place, and time.       Coordination: Coordination normal.   Psychiatric:         Mood and Affect: Mood normal.         Behavior: Behavior normal.       Current Outpatient Medications   Medication Sig Dispense Refill    umeclidinium-vilanterol (ANORO ELLIPTA) 62.5-25 MCG/ACT inhaler Inhale 1 puff into the lungs daily 2 each 0    umeclidinium-vilanterol (ANORO ELLIPTA) 62.5-25 MCG/INH AEPB inhaler Inhale 1 puff into the lungs daily 1 each 3    famotidine (PEPCID) 40 MG tablet take 1 tablet by mouth once daily if needed for HEARTBURN      isosorbide mononitrate (IMDUR) 30 MG extended release tablet TAKE 1 2 (ONE HALF) TABLET BY MOUTH ONCE DAILY FOR 30 DAYS 30 tablet 5    atorvastatin (LIPITOR) 20 MG tablet TAKE 1 TABLET BY MOUTH ONCE DAILY FOR 30 DAYS 30 tablet 5    metoprolol succinate (TOPROL XL) 25 MG extended release tablet TAKE 1 TABLET BY MOUTH ONCE DAILY FOR 30 DAYS 30 tablet 5    spironolactone (ALDACTONE) 25 MG tablet TAKE 1 TABLET BY MOUTH ONCE DAILY WITH FOOD FOR 30 DAYS 30 tablet 5    albuterol sulfate  (90 Base) MCG/ACT inhaler       losartan (COZAAR) 50 MG tablet TAKE 1 TABLET BY MOUTH ONCE DAILY aspirin 81 MG EC tablet Take 81 mg by mouth daily      nitroGLYCERIN (NITROSTAT) 0.4 MG SL tablet Place 0.4 mg under the tongue every 5 minutes as needed for Chest pain up to max of 3 total doses. If no relief after 1 dose, call 911. Biotin 5000 MCG TABS Take by mouth      calcium carbonate 600 MG TABS tablet Take 1 tablet by mouth daily      Chromium-Cinnamon (CINNAMON PLUS CHROMIUM)  MCG-MG CAPS Take by mouth      vitamin B-12 (CYANOCOBALAMIN) 1000 MCG tablet Take 1,000 mcg by mouth daily      Ascorbic Acid (VITAMIN C) 500 MG CAPS Take by mouth      Cholecalciferol (VITAMIN D3) 50 MCG (2000 UT) CAPS Take by mouth      clobetasol (TEMOVATE) 0.05 % ointment Apply to affected area BID x 2 wks 1 Tube 2     No current facility-administered medications for this visit. Results for orders placed during the hospital encounter of 05/10/22    XR CHEST (2 VW)    Narrative  Exam: XR CHEST (2 VW)    CLINICAL HISTORY: J44.9 Chronic obstructive pulmonary disease, unspecified COPD type ([address] ICD10    COMPARISON: None    CHEST X-ray, 2 VIEWS    FINDINGS:      The cardiomediastinal silhouette is within normal limits. There are no infiltrates, consolidations or effusions. .  Bones of the thorax appear intact. Impression  No radiographic evidence of acute intrathoracic process. Results for orders placed during the hospital encounter of 03/05/21    XR CHEST (2 VW)    Narrative  EXAMINATION: XR CHEST (2 VW)    CLINICAL HISTORY: J44.9 Chronic obstructive pulmonary disease, unspecified COPD type ([address] ICD10    COMPARISONS: None    FINDINGS:    Two views of the chest are submitted. The cardiac silhouette is of normal size configuration. Pulmonary vascular unremarkable. Right sided trachea. No focal infiltrates. No effusions. No Pneumothoraces.     Impression  NO ACUTE ACTIVE CARDIOPULMONARY PROCESS    Results for orders placed during the hospital encounter of 06/30/21    CT CHEST W CONTRAST    Narrative  EXAMINATION:  CT SCAN CHEST    CLINICAL HISTORY:  Axillary adenopathy    COMPARISON:  None    TECHNIQUE:  Multiple serial axial images of the chest from the base neck through the upper abdomen with both sagittal coronal reconstruction was performed following the intravenous administration of 100 mL of Isovue-370. Markers were placed along the  right upper chest axillary region in patient's area of concern. FINDINGS:  There are no focal parenchymal abnormalities no pleural effusions. No pneumothoraces. No significant periaortic, pretracheal, parahilar or subcarinal adenopathy. Within the field-of-view no significant supraclavicular adenopathy. No significant left or right axillary adenopathy. There are bilateral small subcentimeter nonspecific axillary lymph nodes noted. The markers are along the right lateral lower chest wall. The skin does not show any thickening. There is subcutaneous inflammatory stranding. No masses. In the field-of-view visualized abdominal contents are unremarkable. Visualized osseous structures are intact    Impression  UNREMARKABLE CT SCAN THE CHEST AS DESCRIBED ABOVE      Assessment/Plan:     1. Chronic obstructive pulmonary disease, unspecified COPD type ([address]  She is using She is on anoro ellipta 1 puff daily, stop using it she need sample . She is using  albuterol HFA  PrnC/o shortness of breath  with exertion, climbing stair. No Wheezing. C/o Cough , dry. she will check  with insurance which inhaler is cover. She had PFT 5/22 done mild obstructive disease. CXR 5/22 no active disease.    - umeclidinium-vilanterol (ANORO ELLIPTA) 62.5-25 MCG/ACT inhaler; Inhale 1 puff into the lungs daily  Dispense: 2 each; Refill: 0    2. Obesity (BMI 30-39. 9)  She is advised try to lose weight. obesity related risk explained to the patient ,  Current weight:  216 lb (98 kg) Lbs. BMI:  Body mass index is 38.26 kg/m².   Suggested weight control approaches, including dietary changes , exercise, behavioral modification. Return in about 4 months (around 6/21/2023) for COPD.       Teresa Parada MD

## 2023-03-14 ENCOUNTER — OFFICE VISIT (OUTPATIENT)
Dept: CARDIOLOGY CLINIC | Age: 68
End: 2023-03-14
Payer: MEDICARE

## 2023-03-14 VITALS
OXYGEN SATURATION: 96 % | HEIGHT: 63 IN | HEART RATE: 65 BPM | BODY MASS INDEX: 39.8 KG/M2 | DIASTOLIC BLOOD PRESSURE: 80 MMHG | SYSTOLIC BLOOD PRESSURE: 124 MMHG | WEIGHT: 224.6 LBS

## 2023-03-14 DIAGNOSIS — I10 ESSENTIAL HYPERTENSION: ICD-10-CM

## 2023-03-14 DIAGNOSIS — R73.03 BORDERLINE DIABETES: ICD-10-CM

## 2023-03-14 DIAGNOSIS — E78.2 MIXED HYPERLIPIDEMIA: ICD-10-CM

## 2023-03-14 DIAGNOSIS — Z87.891 PERSONAL HISTORY OF TOBACCO USE: ICD-10-CM

## 2023-03-14 DIAGNOSIS — I25.10 CORONARY ARTERY DISEASE INVOLVING NATIVE CORONARY ARTERY OF NATIVE HEART WITHOUT ANGINA PECTORIS: Primary | ICD-10-CM

## 2023-03-14 DIAGNOSIS — E66.9 OBESITY (BMI 30-39.9): ICD-10-CM

## 2023-03-14 DIAGNOSIS — R25.2 LEG CRAMPS: ICD-10-CM

## 2023-03-14 PROCEDURE — 3079F DIAST BP 80-89 MM HG: CPT | Performed by: INTERNAL MEDICINE

## 2023-03-14 PROCEDURE — 93000 ELECTROCARDIOGRAM COMPLETE: CPT | Performed by: INTERNAL MEDICINE

## 2023-03-14 PROCEDURE — 3074F SYST BP LT 130 MM HG: CPT | Performed by: INTERNAL MEDICINE

## 2023-03-14 PROCEDURE — 1090F PRES/ABSN URINE INCON ASSESS: CPT | Performed by: INTERNAL MEDICINE

## 2023-03-14 PROCEDURE — G8417 CALC BMI ABV UP PARAM F/U: HCPCS | Performed by: INTERNAL MEDICINE

## 2023-03-14 PROCEDURE — 3017F COLORECTAL CA SCREEN DOC REV: CPT | Performed by: INTERNAL MEDICINE

## 2023-03-14 PROCEDURE — 1123F ACP DISCUSS/DSCN MKR DOCD: CPT | Performed by: INTERNAL MEDICINE

## 2023-03-14 PROCEDURE — G8399 PT W/DXA RESULTS DOCUMENT: HCPCS | Performed by: INTERNAL MEDICINE

## 2023-03-14 PROCEDURE — 99214 OFFICE O/P EST MOD 30 MIN: CPT | Performed by: INTERNAL MEDICINE

## 2023-03-14 PROCEDURE — G8427 DOCREV CUR MEDS BY ELIG CLIN: HCPCS | Performed by: INTERNAL MEDICINE

## 2023-03-14 PROCEDURE — 1036F TOBACCO NON-USER: CPT | Performed by: INTERNAL MEDICINE

## 2023-03-14 PROCEDURE — G8484 FLU IMMUNIZE NO ADMIN: HCPCS | Performed by: INTERNAL MEDICINE

## 2023-03-14 NOTE — PROGRESS NOTES
Chief Complaint   Patient presents with    1 Year Follow Up       2-2-21: Patient presents for initial medical evaluation. Patient is followed on a regular basis by Dr. Manjit Shrestha. Patient recently moved from [place]. She was follow-up with cardiology and noted to have angina, status post abnormal stress test a couple years ago and underwent cardiac catheterization which showed 40% coronary artery disease stenosis. No PCI was performed. Patient also noted to have hypertension and placed on blood pressure medications. Pt denies chest pain, dyspnea, dyspnea on exertion, change in exercise capacity, fatigue,  nausea, vomiting, diarrhea, constipation, motor weakness, insomnia, weight loss, syncope, dizziness, lightheadedness, palpitations, PND, orthopnea, or claudication. EKG today with normal sinus rhythm, right bundle branch block, no evidence of any acute ischemia. She denies tobacco abuse. She states she has been diagnosed with borderline diabetes. Positive history of COPD, quit smoking a few years ago. 8-24-21: Pt denies chest pain,  fatigue,  nausea, vomiting, diarrhea, constipation, motor weakness, insomnia, weight loss, syncope, dizziness, lightheadedness, palpitations, PND, orthopnea, or claudication. No nitro use. BP and hr are good. CAD is stable. No LE discoloration or ulcers. No LE edema. No CHF type symptoms. Lipid profile is normal. No recent hospitalization. No change in meds. Does have baseline SOB. No smoking. Has lost a few pounds. No LE edema. She does have GERD and is on Pepcid. 3-1-22: hx of mild CAD. Pt denies chest pain, dyspnea, dyspnea on exertion, change in exercise capacity, fatigue,  nausea, vomiting, diarrhea, constipation, motor weakness, insomnia, weight loss, syncope, dizziness, lightheadedness, palpitations, PND, orthopnea, or claudication. No nitro use. BP and hr are good. CAD is stable. No LE discoloration or ulcers. No LE edema. No CHF type symptoms.  Lipid profile is normal. No recent hospitalization. No change in meds. No smoking. 3/14/2023: Patient complains of like knots/cramping at night. Patient with history of nonobstructive coronary disease per cardiac catheterization in 2019 in [facility]. Patient does have history of remote tobacco use as well as COPD. She does have history of osteoarthritis as well as GERD  Potassium is normal.  Patient is on a statin. Patient Active Problem List   Diagnosis    Hyperlipidemia    Gastrointestinal hemorrhage    Chronic obstructive pulmonary disease (HCC)    Adverse reaction to drug    Hx of colonic polyps    Arthritis    Coronary artery disease involving native coronary artery of native heart without angina pectoris    Essential hypertension    Borderline diabetes    Obesity (BMI 30-39. 9)    Personal history of tobacco use       Past Surgical History:   Procedure Laterality Date    ABDOMEN SURGERY      rupture spleen    APPENDECTOMY      ENDOMETRIAL ABLATION      TUBAL LIGATION         Social History     Socioeconomic History    Marital status: Single   Tobacco Use    Smoking status: Never    Smokeless tobacco: Never   Substance and Sexual Activity    Alcohol use: Yes     Comment: rarely    Drug use: Never       Family History   Problem Relation Age of Onset    Cancer Maternal Grandmother     Breast Cancer Neg Hx     Colon Cancer Neg Hx     Eclampsia Neg Hx     Diabetes Neg Hx     Hypertension Neg Hx     Ovarian Cancer Neg Hx      Labor Neg Hx     Spont Abortions Neg Hx     Stroke Neg Hx        Current Outpatient Medications   Medication Sig Dispense Refill    umeclidinium-vilanterol (ANORO ELLIPTA) 62.5-25 MCG/ACT inhaler Inhale 1 puff into the lungs daily 2 each 0    umeclidinium-vilanterol (ANORO ELLIPTA) 62.5-25 MCG/INH AEPB inhaler Inhale 1 puff into the lungs daily 1 each 3    famotidine (PEPCID) 40 MG tablet take 1 tablet by mouth once daily if needed for HEARTBURN      isosorbide mononitrate (IMDUR) 30 MG extended release tablet TAKE 1 2 (ONE HALF) TABLET BY MOUTH ONCE DAILY FOR 30 DAYS 30 tablet 5    atorvastatin (LIPITOR) 20 MG tablet TAKE 1 TABLET BY MOUTH ONCE DAILY FOR 30 DAYS 30 tablet 5    metoprolol succinate (TOPROL XL) 25 MG extended release tablet TAKE 1 TABLET BY MOUTH ONCE DAILY FOR 30 DAYS 30 tablet 5    spironolactone (ALDACTONE) 25 MG tablet TAKE 1 TABLET BY MOUTH ONCE DAILY WITH FOOD FOR 30 DAYS 30 tablet 5    albuterol sulfate  (90 Base) MCG/ACT inhaler       losartan (COZAAR) 50 MG tablet TAKE 1 TABLET BY MOUTH ONCE DAILY      aspirin 81 MG EC tablet Take 81 mg by mouth daily      Biotin 5000 MCG TABS Take by mouth      calcium carbonate 600 MG TABS tablet Take 1 tablet by mouth daily      Chromium-Cinnamon (CINNAMON PLUS CHROMIUM)  MCG-MG CAPS Take by mouth      vitamin B-12 (CYANOCOBALAMIN) 1000 MCG tablet Take 1,000 mcg by mouth daily      Ascorbic Acid (VITAMIN C) 500 MG CAPS Take by mouth      Cholecalciferol (VITAMIN D3) 50 MCG (2000 UT) CAPS Take by mouth      nitroGLYCERIN (NITROSTAT) 0.4 MG SL tablet Place 0.4 mg under the tongue every 5 minutes as needed for Chest pain up to max of 3 total doses. If no relief after 1 dose, call 911. (Patient not taking: Reported on 3/14/2023)      clobetasol (TEMOVATE) 0.05 % ointment Apply to affected area BID x 2 wks (Patient not taking: Reported on 3/14/2023) 1 Tube 2     No current facility-administered medications for this visit. Codeine and Novocain [procaine]    Review of Systems:  General ROS: negative  Psychological ROS: negative  Hematological and Lymphatic ROS: No history of blood clots or bleeding disorder.    Respiratory ROS: no cough, shortness of breath, or wheezing  Cardiovascular ROS: no chest pain or dyspnea on exertion  Gastrointestinal ROS: no abdominal pain, change in bowel habits, or black or bloody stools  Genito-Urinary ROS: no dysuria, trouble voiding, or hematuria  Musculoskeletal ROS: negative  Neurological ROS: no TIA or stroke symptoms  Dermatological ROS: negative    VITALS:  Blood pressure 124/80, pulse 65, height 5' 3\" (1.6 m), weight 224 lb 9.6 oz (101.9 kg), SpO2 96 %. Body mass index is 39.79 kg/m². Physical Examination:  General appearance - alert, well appearing, and in no distress  Mental status - alert, oriented to person, place, and time  Neck - Neck is supple, no JVD or carotid bruits. No thyromegaly or adenopathy. Chest - clear to auscultation, no wheezes, rales or rhonchi, symmetric air entry  Heart - normal rate, regular rhythm, normal S1, S2, no murmurs, rubs, clicks or gallops  Abdomen - soft, nontender, nondistended, no masses or organomegaly  Neurological - alert, oriented, normal speech, no focal findings or movement disorder noted  Extremities - peripheral pulses normal, no pedal edema, no clubbing or cyanosis  Skin - normal coloration and turgor, no rashes, no suspicious skin lesions noted      Pulses:   Carotid pulses are 3+ on the right side, and 3+ on the left side. Radial pulses are 3+ on the right side, and 3+ on the left side. Femoral pulses are 3+ on the right side, and 3+ on the left side. Popliteal pulses are 3+ on the right side, and 3+ on the left side. EKG: normal sinus rhythm, nonspecific ST and T waves changes, RBBB    Orders Placed This Encounter   Procedures    EKG 12 lead       ASSESSMENT:     Diagnosis Orders   1. Coronary artery disease involving native coronary artery of native heart without angina pectoris  EKG 12 lead      2. Borderline diabetes        3. Essential hypertension        4. Mixed hyperlipidemia        5. Obesity (BMI 30-39.9)        6. Personal history of tobacco use        7. Leg cramps              PLAN:     Patient will need to continue to follow up with you for their general medical care    As always, aggressive risk factor modification is strongly recommended.  We should adhere to the JNC VIII guidelines for HTN management and the NCEP ATP III guidelines for LDL-C management. Cardiac diet is always recommended with low fat, cholesterol, calories and sodium. Continue medications at current doses. Hold statin for Leg cramps    Check NUNU/PVR given risk factors if symptoms not resolved with holding statin. Check EKG    Will continue to monitor patient clinically, if symptoms develop or worsen, they are to let me know ASAP or head to the nearest emergeny room. Patient was advised and encouraged to check blood pressure at home or at a pharmacy, maintain a logbook, and also call us back if blood pressure are above the target ranges or if it is low. Patient clearly understands and agrees to the instructions. We will need to continue to monitor muscle and liver enzymes, BUN, CR, and electrolytes. Details of medical condition explained and patient was warned about adverse consequences of uncontrolled medical conditions and possible side effects of prescribed medications.

## 2023-04-21 RX ORDER — ISOSORBIDE MONONITRATE 30 MG/1
TABLET, EXTENDED RELEASE ORAL
Qty: 30 TABLET | Refills: 5 | Status: SHIPPED | OUTPATIENT
Start: 2023-04-21

## 2023-04-21 NOTE — TELEPHONE ENCOUNTER
Comments: pt called for refill. Last Office Visit (last PCP visit):   3/14/2023    Next Visit Date:  Future Appointments   Date Time Provider Khadijah Langley   6/20/2023  8:30 AM Andrea Medina MD [facility]   3/12/2024  9:15 AM Wes Gerhardt Pages, DO [facility]       **If hasn't been seen in over a year OR hasn't followed up according to last diabetes/ADHD visit, make appointment for patient before sending refill to provider.     Rx requested:  Requested Prescriptions     Pending Prescriptions Disp Refills    isosorbide mononitrate (IMDUR) 30 MG extended release tablet 30 tablet 5     Sig: TAKE 1 2 (ONE HALF) TABLET BY MOUTH ONCE DAILY FOR 30 DAYS

## 2023-08-24 ENCOUNTER — OFFICE VISIT (OUTPATIENT)
Dept: PULMONOLOGY | Age: 68
End: 2023-08-24
Payer: MEDICARE

## 2023-08-24 VITALS
DIASTOLIC BLOOD PRESSURE: 68 MMHG | HEART RATE: 77 BPM | OXYGEN SATURATION: 96 % | WEIGHT: 217 LBS | SYSTOLIC BLOOD PRESSURE: 128 MMHG | BODY MASS INDEX: 38.44 KG/M2 | TEMPERATURE: 97.1 F

## 2023-08-24 DIAGNOSIS — E66.9 OBESITY (BMI 30-39.9): ICD-10-CM

## 2023-08-24 DIAGNOSIS — J44.9 CHRONIC OBSTRUCTIVE PULMONARY DISEASE, UNSPECIFIED COPD TYPE (HCC): Primary | ICD-10-CM

## 2023-08-24 DIAGNOSIS — Z87.891 PERSONAL HISTORY OF TOBACCO USE: ICD-10-CM

## 2023-08-24 PROCEDURE — 1123F ACP DISCUSS/DSCN MKR DOCD: CPT | Performed by: INTERNAL MEDICINE

## 2023-08-24 PROCEDURE — 3017F COLORECTAL CA SCREEN DOC REV: CPT | Performed by: INTERNAL MEDICINE

## 2023-08-24 PROCEDURE — 3023F SPIROM DOC REV: CPT | Performed by: INTERNAL MEDICINE

## 2023-08-24 PROCEDURE — 1036F TOBACCO NON-USER: CPT | Performed by: INTERNAL MEDICINE

## 2023-08-24 PROCEDURE — 99214 OFFICE O/P EST MOD 30 MIN: CPT | Performed by: INTERNAL MEDICINE

## 2023-08-24 PROCEDURE — 3074F SYST BP LT 130 MM HG: CPT | Performed by: INTERNAL MEDICINE

## 2023-08-24 PROCEDURE — 1090F PRES/ABSN URINE INCON ASSESS: CPT | Performed by: INTERNAL MEDICINE

## 2023-08-24 PROCEDURE — G8399 PT W/DXA RESULTS DOCUMENT: HCPCS | Performed by: INTERNAL MEDICINE

## 2023-08-24 PROCEDURE — G8427 DOCREV CUR MEDS BY ELIG CLIN: HCPCS | Performed by: INTERNAL MEDICINE

## 2023-08-24 PROCEDURE — 3078F DIAST BP <80 MM HG: CPT | Performed by: INTERNAL MEDICINE

## 2023-08-24 PROCEDURE — G8417 CALC BMI ABV UP PARAM F/U: HCPCS | Performed by: INTERNAL MEDICINE

## 2023-08-24 ASSESSMENT — ENCOUNTER SYMPTOMS
CHEST TIGHTNESS: 0
DIARRHEA: 0
SHORTNESS OF BREATH: 1
VOICE CHANGE: 0
SINUS PRESSURE: 0
VOMITING: 0
WHEEZING: 0
NAUSEA: 0
COUGH: 0
TROUBLE SWALLOWING: 0
EYE DISCHARGE: 0
ABDOMINAL PAIN: 0
EYE ITCHING: 0
SORE THROAT: 0
RHINORRHEA: 0

## 2023-08-24 NOTE — PROGRESS NOTES
Subjective:     Rajat Duron is a 79 y.o. female who complains today of:     Chief Complaint   Patient presents with    Follow-up     4m f/u on COPD       HPI  She is on anoro ellipta 1 puff daily, She is using  albuterol HFA Prn  C/o shortness of breath  with exertion, climbing stair. No Wheezing. No cough  No Hemoptysis  No Chest tightness  No Chest pain with radiation  or pleuritic pain  No Fever or chills. No Rhinorrhea and postnasal drip. She had PFT  done mild obstructive disease  CXR  no active disease.     Allergies:  Codeine and Novocain [procaine]  Past Medical History:   Diagnosis Date    Borderline diabetes 2021    Class II obesity 2021    Coronary artery disease involving native coronary artery of native heart without angina pectoris 2021    Coronary artery disease involving native coronary artery of native heart without angina pectoris 2021    Essential hypertension 2021    Heart disease     History of tobacco abuse 2021    Hypertension      Past Surgical History:   Procedure Laterality Date    ABDOMEN SURGERY      rupture spleen    APPENDECTOMY      ENDOMETRIAL ABLATION      TUBAL LIGATION       Family History   Problem Relation Age of Onset    Cancer Maternal Grandmother     Breast Cancer Neg Hx     Colon Cancer Neg Hx     Eclampsia Neg Hx     Diabetes Neg Hx     Hypertension Neg Hx     Ovarian Cancer Neg Hx      Labor Neg Hx     Spont Abortions Neg Hx     Stroke Neg Hx      Social History     Socioeconomic History    Marital status: Single     Spouse name: Not on file    Number of children: Not on file    Years of education: Not on file    Highest education level: Not on file   Occupational History    Not on file   Tobacco Use    Smoking status: Never    Smokeless tobacco: Never   Substance and Sexual Activity    Alcohol use: Yes     Comment: rarely    Drug use: Never    Sexual activity: Not on file   Other Topics Concern    Not on file

## 2023-10-05 ENCOUNTER — HOSPITAL ENCOUNTER (OUTPATIENT)
Dept: GENERAL RADIOLOGY | Age: 68
Discharge: HOME | End: 2023-10-07
Attending: INTERNAL MEDICINE
Payer: MEDICARE

## 2023-10-05 ENCOUNTER — HOSPITAL ENCOUNTER (OUTPATIENT)
Dept: PULMONOLOGY | Age: 68
Discharge: HOME | End: 2023-10-05
Payer: MEDICARE

## 2023-10-05 DIAGNOSIS — J44.9 CHRONIC OBSTRUCTIVE PULMONARY DISEASE, UNSPECIFIED COPD TYPE (HCC): ICD-10-CM

## 2023-10-05 PROCEDURE — 71046 X-RAY EXAM CHEST 2 VIEWS: CPT

## 2023-10-05 PROCEDURE — 94060 EVALUATION OF WHEEZING: CPT

## 2023-10-05 PROCEDURE — 94729 DIFFUSING CAPACITY: CPT

## 2023-10-05 PROCEDURE — 94726 PLETHYSMOGRAPHY LUNG VOLUMES: CPT

## 2023-10-05 PROCEDURE — 6360000002 HC RX W HCPCS

## 2023-10-05 RX ORDER — ALBUTEROL SULFATE 2.5 MG/3ML
SOLUTION RESPIRATORY (INHALATION)
Status: COMPLETED
Start: 2023-10-05 | End: 2023-10-05

## 2023-10-05 RX ADMIN — ALBUTEROL SULFATE 2.5 MG: 2.5 SOLUTION RESPIRATORY (INHALATION) at 09:15

## 2023-10-06 NOTE — PROCEDURES
[facility] Honorio Morley, [address] [facility]                    PULMONARY FUNCTION  João Pop   79 y.o.   female     Test interpretation     PFT meets ATS criteria for moderate obstructive airway disease with no significant response to bronchodilator  Lung volumes shows air trapping  Diffusion capacity is normal    Clinical correlation is recommended     Sal Verde MD [facility], 10/6/2023 9:51 AM

## 2024-03-12 ENCOUNTER — TELEPHONE (OUTPATIENT)
Dept: CARDIOLOGY CLINIC | Age: 69
End: 2024-03-12

## 2024-03-12 NOTE — TELEPHONE ENCOUNTER
Appointment canceled for Gilma Cohen ([id number])   Visit Type: FOLLOW UP   Date        Time      Length    Provider                  Department   3/12/2024    9:15 AM  15 mins.  DO [facility]      Reason for Cancellation: Other      Patient Comments: Have a new Cardiologist Canceling Dr. Anna as  my Heart Doctor  As of 01/01/24. Thank Lashay Cohen     Appointment canceled  (Newest Message First)  View All Conversations on this Encounter  Gilma Cohen  P [facility] Front Desk3 days ago       Appointment canceled for Gilma Cohen ([id number])  Visit Type: FOLLOW UP  Date        Time      Length    Provider                  Department  3/12/2024    9:15 AM  15 mins.  DO [facility]     Reason for Cancellation: Other     Patient Comments: Have a new Cardiologist Canceling Dr. Anna as  my Heart Doctor  As of 01/01/24. Thank Lashay Mullen, Batch Job User  Gilma Cohen R1 year ago

## 2024-03-12 NOTE — TELEPHONE ENCOUNTER
Reason for Cancellation: Other     Patient Comments: Have a new Cardiologist Canceling Dr. Anna as  my Heart Doctor  As of 01/01/24. Thank Lashay Cohen